# Patient Record
Sex: MALE | Race: WHITE | NOT HISPANIC OR LATINO | Employment: OTHER | URBAN - METROPOLITAN AREA
[De-identification: names, ages, dates, MRNs, and addresses within clinical notes are randomized per-mention and may not be internally consistent; named-entity substitution may affect disease eponyms.]

---

## 2017-01-01 ENCOUNTER — HOSPITAL ENCOUNTER (EMERGENCY)
Facility: HOSPITAL | Age: 77
Discharge: HOME | End: 2017-02-24
Attending: EMERGENCY MEDICINE | Admitting: EMERGENCY MEDICINE
Payer: COMMERCIAL

## 2017-01-01 ENCOUNTER — HOSPITAL ENCOUNTER (EMERGENCY)
Facility: HOSPITAL | Age: 77
Discharge: HOME | End: 2017-03-18
Attending: EMERGENCY MEDICINE | Admitting: EMERGENCY MEDICINE
Payer: COMMERCIAL

## 2017-01-01 ENCOUNTER — APPOINTMENT (EMERGENCY)
Dept: RADIOLOGY | Facility: HOSPITAL | Age: 77
End: 2017-01-01
Payer: COMMERCIAL

## 2017-01-01 ENCOUNTER — GENERIC CONVERSION - ENCOUNTER (OUTPATIENT)
Dept: OTHER | Facility: OTHER | Age: 77
End: 2017-01-01

## 2017-01-01 ENCOUNTER — APPOINTMENT (EMERGENCY)
Dept: CT IMAGING | Facility: HOSPITAL | Age: 77
End: 2017-01-01
Payer: COMMERCIAL

## 2017-01-01 VITALS
WEIGHT: 140 LBS | TEMPERATURE: 97.5 F | HEART RATE: 71 BPM | DIASTOLIC BLOOD PRESSURE: 61 MMHG | SYSTOLIC BLOOD PRESSURE: 120 MMHG | BODY MASS INDEX: 23.3 KG/M2 | RESPIRATION RATE: 18 BRPM | OXYGEN SATURATION: 96 %

## 2017-01-01 VITALS
WEIGHT: 134 LBS | DIASTOLIC BLOOD PRESSURE: 68 MMHG | SYSTOLIC BLOOD PRESSURE: 115 MMHG | TEMPERATURE: 97.8 F | OXYGEN SATURATION: 98 % | RESPIRATION RATE: 16 BRPM | BODY MASS INDEX: 22.3 KG/M2 | HEART RATE: 67 BPM

## 2017-01-01 DIAGNOSIS — R56.9 SEIZURE (HCC): Primary | ICD-10-CM

## 2017-01-01 DIAGNOSIS — M70.21 OLECRANON BURSITIS OF RIGHT ELBOW: Primary | ICD-10-CM

## 2017-01-01 DIAGNOSIS — R53.1 WEAKNESS: ICD-10-CM

## 2017-01-01 LAB
ALBUMIN SERPL BCP-MCNC: 3 G/DL (ref 3.5–5)
ALP SERPL-CCNC: 97 U/L (ref 46–116)
ALT SERPL W P-5'-P-CCNC: 18 U/L (ref 12–78)
ANION GAP BLD CALC-SCNC: 20 MMOL/L (ref 4–13)
ANION GAP SERPL CALCULATED.3IONS-SCNC: 10 MMOL/L (ref 4–13)
APTT PPP: 28 SECONDS (ref 24–36)
AST SERPL W P-5'-P-CCNC: 13 U/L (ref 5–45)
ATRIAL RATE: 80 BPM
BASOPHILS # BLD AUTO: 0.02 THOUSANDS/ΜL (ref 0–0.1)
BASOPHILS NFR BLD AUTO: 0 % (ref 0–1)
BILIRUB DIRECT SERPL-MCNC: 0.12 MG/DL (ref 0–0.2)
BILIRUB SERPL-MCNC: 0.2 MG/DL (ref 0.2–1)
BUN BLD-MCNC: 22 MG/DL (ref 5–25)
BUN SERPL-MCNC: 21 MG/DL (ref 5–25)
CA-I BLD-SCNC: 1.14 MMOL/L (ref 1.12–1.32)
CALCIUM SERPL-MCNC: 8.4 MG/DL (ref 8.3–10.1)
CHLORIDE BLD-SCNC: 102 MMOL/L (ref 100–108)
CHLORIDE SERPL-SCNC: 104 MMOL/L (ref 100–108)
CO2 SERPL-SCNC: 27 MMOL/L (ref 21–32)
CREAT BLD-MCNC: 1.6 MG/DL (ref 0.6–1.3)
CREAT SERPL-MCNC: 1.86 MG/DL (ref 0.6–1.3)
EOSINOPHIL # BLD AUTO: 0.05 THOUSAND/ΜL (ref 0–0.61)
EOSINOPHIL NFR BLD AUTO: 1 % (ref 0–6)
ERYTHROCYTE [DISTWIDTH] IN BLOOD BY AUTOMATED COUNT: 18.2 % (ref 11.6–15.1)
GFR SERPL CREATININE-BSD FRML MDRD: 35.3 ML/MIN/1.73SQ M
GFR SERPL CREATININE-BSD FRML MDRD: 42 ML/MIN/1.73SQ M
GLUCOSE SERPL-MCNC: 104 MG/DL (ref 65–140)
GLUCOSE SERPL-MCNC: 99 MG/DL (ref 65–140)
HCT VFR BLD AUTO: 36.7 % (ref 36.5–49.3)
HCT VFR BLD CALC: 40 % (ref 36.5–49.3)
HGB BLD-MCNC: 11.7 G/DL (ref 12–17)
HGB BLDA-MCNC: 13.6 G/DL (ref 12–17)
INR PPP: 1.22 (ref 0.86–1.16)
LYMPHOCYTES # BLD AUTO: 1.12 THOUSANDS/ΜL (ref 0.6–4.47)
LYMPHOCYTES NFR BLD AUTO: 17 % (ref 14–44)
MCH RBC QN AUTO: 29.6 PG (ref 26.8–34.3)
MCHC RBC AUTO-ENTMCNC: 31.9 G/DL (ref 31.4–37.4)
MCV RBC AUTO: 93 FL (ref 82–98)
MONOCYTES # BLD AUTO: 0.34 THOUSAND/ΜL (ref 0.17–1.22)
MONOCYTES NFR BLD AUTO: 5 % (ref 4–12)
NEUTROPHILS # BLD AUTO: 5.25 THOUSANDS/ΜL (ref 1.85–7.62)
NEUTS SEG NFR BLD AUTO: 77 % (ref 43–75)
P AXIS: 69 DEGREES
PCO2 BLD: 24 MMOL/L (ref 21–32)
PLATELET # BLD AUTO: 147 THOUSANDS/UL (ref 149–390)
PMV BLD AUTO: 11.3 FL (ref 8.9–12.7)
POTASSIUM BLD-SCNC: 4.6 MMOL/L (ref 3.5–5.3)
POTASSIUM SERPL-SCNC: 4.5 MMOL/L (ref 3.5–5.3)
PR INTERVAL: 152 MS
PROT SERPL-MCNC: 6.7 G/DL (ref 6.4–8.2)
PROTHROMBIN TIME: 15.1 SECONDS (ref 12–14.3)
QRS AXIS: 49 DEGREES
QRSD INTERVAL: 102 MS
QT INTERVAL: 374 MS
QTC INTERVAL: 431 MS
RBC # BLD AUTO: 3.95 MILLION/UL (ref 3.88–5.62)
SODIUM BLD-SCNC: 141 MMOL/L (ref 136–145)
SODIUM SERPL-SCNC: 141 MMOL/L (ref 136–145)
SPECIMEN SOURCE: ABNORMAL
T WAVE AXIS: 77 DEGREES
VENTRICULAR RATE: 80 BPM
WBC # BLD AUTO: 6.78 THOUSAND/UL (ref 4.31–10.16)

## 2017-01-01 PROCEDURE — 85025 COMPLETE CBC W/AUTO DIFF WBC: CPT | Performed by: EMERGENCY MEDICINE

## 2017-01-01 PROCEDURE — 85610 PROTHROMBIN TIME: CPT | Performed by: EMERGENCY MEDICINE

## 2017-01-01 PROCEDURE — 80048 BASIC METABOLIC PNL TOTAL CA: CPT | Performed by: EMERGENCY MEDICINE

## 2017-01-01 PROCEDURE — 71020 HB CHEST X-RAY 2VW FRONTAL&LATL: CPT

## 2017-01-01 PROCEDURE — 99283 EMERGENCY DEPT VISIT LOW MDM: CPT

## 2017-01-01 PROCEDURE — 80047 BASIC METABLC PNL IONIZED CA: CPT

## 2017-01-01 PROCEDURE — 93005 ELECTROCARDIOGRAM TRACING: CPT | Performed by: EMERGENCY MEDICINE

## 2017-01-01 PROCEDURE — 36415 COLL VENOUS BLD VENIPUNCTURE: CPT | Performed by: EMERGENCY MEDICINE

## 2017-01-01 PROCEDURE — 96361 HYDRATE IV INFUSION ADD-ON: CPT

## 2017-01-01 PROCEDURE — 70450 CT HEAD/BRAIN W/O DYE: CPT

## 2017-01-01 PROCEDURE — 85014 HEMATOCRIT: CPT

## 2017-01-01 PROCEDURE — 99285 EMERGENCY DEPT VISIT HI MDM: CPT

## 2017-01-01 PROCEDURE — 85730 THROMBOPLASTIN TIME PARTIAL: CPT | Performed by: EMERGENCY MEDICINE

## 2017-01-01 PROCEDURE — 96374 THER/PROPH/DIAG INJ IV PUSH: CPT

## 2017-01-01 PROCEDURE — 80076 HEPATIC FUNCTION PANEL: CPT | Performed by: EMERGENCY MEDICINE

## 2017-01-01 RX ORDER — LIDOCAINE HYDROCHLORIDE AND EPINEPHRINE 10; 10 MG/ML; UG/ML
1 INJECTION, SOLUTION INFILTRATION; PERINEURAL ONCE
Status: COMPLETED | OUTPATIENT
Start: 2017-01-01 | End: 2017-01-01

## 2017-01-01 RX ORDER — ONDANSETRON 2 MG/ML
INJECTION INTRAMUSCULAR; INTRAVENOUS
Status: COMPLETED
Start: 2017-01-01 | End: 2017-01-01

## 2017-01-01 RX ORDER — ONDANSETRON 2 MG/ML
4 INJECTION INTRAMUSCULAR; INTRAVENOUS ONCE
Status: COMPLETED | OUTPATIENT
Start: 2017-01-01 | End: 2017-01-01

## 2017-01-01 RX ORDER — ONDANSETRON 2 MG/ML
INJECTION INTRAMUSCULAR; INTRAVENOUS
Status: DISCONTINUED
Start: 2017-01-01 | End: 2017-01-01 | Stop reason: HOSPADM

## 2017-01-01 RX ADMIN — ONDANSETRON 4 MG: 2 INJECTION INTRAMUSCULAR; INTRAVENOUS at 11:58

## 2017-01-01 RX ADMIN — SODIUM CHLORIDE 1000 ML: 0.9 INJECTION, SOLUTION INTRAVENOUS at 11:57

## 2017-01-01 RX ADMIN — LIDOCAINE HYDROCHLORIDE,EPINEPHRINE BITARTRATE 1 ML: 10; .01 INJECTION, SOLUTION INFILTRATION; PERINEURAL at 14:35

## 2018-01-10 NOTE — PROGRESS NOTES
Chief Complaint  Marla Elizabeth (nurse) from [facility] called stating the patient had an fall in December and received an open traumatic brain injury  Patient was discharged from rehab 2/10 and is receiving home care for PT/OT, speech therapy and skilled nursing, He's currently not eating or drinking well and a dietician will be in to evaluate him  Did you want to monitor his blood work CBC,BMP etc       Active Problems    1  Acute bronchitis (466 0) (J20 9)   2  Aftercare following surgery of the musculoskeletal system (V58 78) (Z47 89)   3  Anemia (285 9) (D64 9)   4  ASCVD (arteriosclerotic cardiovascular disease) (429 2,440 9) (I25 10)   5  Benign essential hypertension (401 1) (I10)   6  Benign prostatic hypertrophy (600 00) (N40 0)   7  Bulging lumbar disc (722 10) (M51 26)   8  History of CABG   9  Carotid artery stenosis (433 10) (I65 29)   10  Cervical spondylosis (721 0) (M47 812)   11  Cervical stenosis of spine (723 0) (M48 02)   12  Chronic kidney disease, stage 3 (585 3) (N18 3)   13  Chronic kidney disease, stage IV (severe) (585 4) (N18 4)   14  Chronic lumbar radiculopathy (724 4) (M54 16)   15  Coronary artery disease (414 00) (I25 10)   16  Degenerative disc disease, cervical (722 4) (M50 30)   17  Diastolic dysfunction (119 8) (I51 9)   18  Disc degeneration, lumbar (722 52) (M51 36)   19  Dyspnea (786 09) (R06 00)   20  Esophageal reflux (530 81) (K21 9)   21  Gout (274 9) (M10 9)   22  Hypercalcemia (275 42) (E83 52)   23  Hyperlipidemia (272 4) (E78 5)   24  Iron deficiency anemia (280 9) (D50 9)   25  Joint pain, knee (719 46) (M25 569)   26  Long term use of drug (V58 69) (Z79 899)   27  Lumbago (724 2) (M54 5)   28  Lumbar canal stenosis (724 02) (M48 06)   29  Lumbar canal stenosis (724 02) (M48 06)   30  Lumbar canal stenosis (724 02) (M48 06)   31  History of Lumbar canal stenosis (724 02) (M48 06)   32  Lumbar disc herniation (722 10) (M51 26)   33   History of Lumbar radiculopathy (724 4) (M54 16)   34  Lumbar radiculopathy (724 4) (M54 16)   35  Lumbar spinal stenosis (724 02) (M48 06)   36  Lumbar stenosis with neurogenic claudication (724 03) (M48 06)   37  Mitral regurgitation (424 0) (I34 0)   38  Myalgia And Myositis (729 1)   39  Myelomalacia (336 8) (G95 89)   40  Need for prophylactic vaccination against single diseases (V05 9) (Z23)   41  Need for prophylactic vaccination and inoculation against influenza (V04 81) (Z23)   42  Neuropathy (355 9) (G62 9)   43  Non-healing surgical wound, initial encounter (998 83) (T81 89XA)   44  Numbness (782 0) (R20 0)   45  Osteoarthritis of knee (715 36) (M17 9)   46  Peripheral arterial disease (443 9) (I73 9)   47  Premature ventricular contractions (PVCs) (VPCs) (427 69) (I49 3)   48  Pre-operative cardiovascular examination (V72 81) (Z01 810)   49  Scoliosis (737 30) (M41 9)   50  Screening for genitourinary condition (V81 6) (Z13 89)   51  Screening for neurological condition (V80 09) (Z13 89)   52  Shortness of breath (786 05) (R06 02)   53  Sinus tachycardia (427 89) (R00 0)   54  Spinal cord compression (336 9) (G95 20)   55  Spinal deformity (756 10) (Q76 49)   56  Spondylosis of lumbar region without myelopathy or radiculopathy (721 3) (M47 816)   57  Status post umbilical hernia repair, follow-up exam (V67 09) (Y66)   58  Syncope, unspecified syncope type (780 2) (R55)   59  Thoracic disc disorder with myelopathy (722 72) (M51 04)   60  Thoracic myelopathy (721 41) (M47 14)   61  Trigger index finger of left hand (727 03) (M65 322)   62  Umbilical hernia without obstruction and without gangrene (553 1) (K42 9)   63  Urinary frequency (788 41) (R35 0)   64  Visit for pre-operative examination (V72 84) (Z01 818)    Current Meds   1  Allopurinol 300 MG Oral Tablet; TAKE 1 TABLET DAILY; Therapy: (Recorded:26Jan2015) to Recorded   2  Amitriptyline HCl - 10 MG Oral Tablet; TAKE 1 TABLET AT BEDTIME;    Therapy: 50VWR0551 to (Rhiannon Umaña)  Requested for: 32QXL4565; Last   Rx:79Mcm2882 Ordered   3  Aspirin EC 81 MG Oral Tablet Delayed Release; TAKE 1 TABLET DAILY; Therapy: 78CIZ0976 to [id number])  Requested for: 30VJL1395; Last   Rx:05Xnn1345 Ordered   4  Atorvastatin Calcium 20 MG Oral Tablet; TAKE 1 TABLET DAILY; Therapy: 72PBI4049 to (Evaluate:23Gex4796)  Requested for: 29MHA1913; Last   Rx:21Ggr9942 Ordered   5  Azithromycin 250 MG Oral Tablet; TAKE 2 TABLETS ON DAY 1 THEN TAKE 1 TABLET A   DAY FOR 4 DAYS; Therapy: 81ZVQ8364 to (Last Rx:06Uxd3886)  Requested for: 02Fci1418 Ordered   6  Gabapentin 300 MG Oral Capsule; 2 tabs po bid; Therapy: 34Utn5846 to (Evaluate:11Qnb2492)  Requested for: 13CVQ0028; Last   ZX:58SJW5232 Ordered   7  Pantoprazole Sodium 40 MG Oral Tablet Delayed Release; take 1 tablet once daily; Therapy: 75HSA3375 to (Evaluate:63Rbk2774)  Requested for: 91Fsl4515; Status:   ACTIVE - Renewal Voided Recorded   8  Proventil  (90 Base) MCG/ACT Inhalation Aerosol Solution; INHALE 2 PUFFS   EVERY 4 HOURS AS NEEDED FOR COUGH AND WHEEZE;   Therapy: 14Oct2016 to (Last Rx:54Ljc2713)  Requested for: 14Oct2016 Ordered   9  Tamsulosin HCl - 0 4 MG Oral Capsule; Take 1 capsule one-half hour before same meal   daily; Therapy: 02GHR8134 to (Evaluate:53Zls9394)  Requested for: 20Shl3896; Last   Rx:08Ilx1420 Ordered    Allergies    1  Oxycodone-Acetaminophen CAPS    2  No Known Environmental Allergies   3  No Known Food Allergies    Plan  Benign essential hypertension    · (1) CBC/PLT/DIFF; Status:Active; Requested for:86Vfv2771;    · (1) COMPREHENSIVE METABOLIC PANEL; Status:Active; Requested for:84Hvo7018;      Future Appointments    Date/Time Provider Specialty Site   03/13/2017 03:30 PM BELLA Taylor DO, [facility] Hematology On[facility]   [facility]14[facility]2017 12:30 [facility]   07/25/2017 01:30 PM Jose Hidalgo, M D  Neurology [address] [facility]     Signatures   Electronically signed by : BELLA Torres ; Feb 13 2017 10:40AM EST                       (Author)

## 2018-01-11 NOTE — MISCELLANEOUS
Message   Recorded as Task   Date: 03/21/2017 03:24 PM, Created By: [address] [place]   Task Name: Medical Complaint Callback   Assigned To: James Gale   Regarding Patient: Juan Rosado, Status: Active   Comment:    [address] [place] - 21 Mar 2017 3:24 PM     TASK CREATED  Caller: Kristan Beckham, Nurse Navigator; Medical Complaint; [phone]  Nurse from [facility] would like to speak with you in regards to the above patient  She has questions about some of his medications  c/b [phone]   I called back about getting off non-essential meds like statin and donepezil        Signatures   Electronically signed by : BELLA Dixon ; Mar 21 2017  5:21PM EST                       (Author)

## 2018-01-11 NOTE — RESULT NOTES
Message   Please inform patient - colon polyp biopsy was benign  He can call our office to follow up as needed  Verified Results  (1) TISSUE EXAM [id number] 04:28PM [address]      Test Name Result Flag Reference   LAB AP CASE REPORT (Report)     Surgical Pathology Report             Case: [id number]                   Authorizing Provider: Mervat Vidales PA-C   Collected:      05/27/2016 1628        Ordering Location:   [facility]    Received:      05/27/2016 [place] [place] [address] Endoscopy                               Pathologist:      Isa Guerrero MD                              Specimen:  Polyp, Colorectal, Cold biopsy hepatic flexure polyp   LAB AP FINAL DIAGNOSIS      A  Colon, hepatic flexure:    - Colonic mucosa without significant pathologic abnormalities   consistent with redundant mucosal fold  - No dysplasia or neoplasia identified  LAB AP SURGICAL ADDITIONAL INFORMATION (Report)     These tests were developed and their performance characteristics   determined by [facility] or [facility]  They may not be cleared or approved by the [place]  Food and   Drug Administration  The FDA has determined that such clearance or   approval is not necessary  These tests are used for clinical purposes  They should not be regarded as investigational or for research  This   laboratory has been approved by CLIA 88, designated as a high-complexity   laboratory and is qualified to perform these tests  - Interpretation performed at [facility], [address] [address]   LAB AP GROSS DESCRIPTION (Report)     A  The specimen is received in formalin, labeled with the patient's name   and hospital number, and is designated hepatic flexure polyp  The   specimen consists of one tan-pink soft tissue fragment measuring 0 3 cm in   greatest dimension  Entirely submitted  One cassette      Note: The estimated total formalin fixation time based upon information   provided by the submitting clinician and the standard processing schedule   is over 72 hours    MAS

## 2018-01-11 NOTE — RESULT NOTES
Message  Capsule endoscopy report - Small small bowel AVMs and erosion  No active bleeding  Recs- take one iron tablet daily  Monitor Hb closely  I called him and left VM        Signatures   Electronically signed by : Josefina Hester MD; Jan 4 2017  4:23PM EST                       (Author)

## 2018-01-12 NOTE — PROGRESS NOTES
Assessment    1  Iron deficiency anemia (280 9) (D50 9)    Plan  Iron deficiency anemia    · Drink plenty of fluids ; Status:Complete;   Done: 64VKH3280   Ordered; For:Iron deficiency anemia; Ordered By:Britton Maza;   · Follow-up visit in 6 months Evaluation and Treatment  Follow-up  Status: Hold For -  Scheduling  Requested for: 53Tqj1375   Ordered; For: Iron deficiency anemia; Ordered By: Tyler Miles Performed:  Due: 22YEA6883   · (1) CBC/PLT/DIFF; Status:Active; Requested for:69Inq5245;    Perform:[facility]; Due:05Ghz6113;Ordered; For:Iron deficiency anemia; Ordered By:Britton Maza;   · (1) CBC/PLT/DIFF; Status: In Progress - Order Generated; Requested for:Recurring  Schedule: 9/12/2016; 10/10/2016; 11/7/2016; 12/5/2016; 1/2/2017; 1/30/2017; 2/27/2017;  3/2   ; Perform:[facility]; [place]; For:Iron deficiency anemia; Ordered By:Britton Mzaa;    Discussion/Summary  Discussion Summary:   In summary, this is a 66year old man with a history of anemia as outlined  He had to bleeding episodes over the past 3 or 4 months  He had an EGD and colonoscopy, which showed no evident bleeding source  Capsule endoscopy is planned but has not yet been accomplished  We will be contacting GI to expedite this process  We reviewed the possibility of small bowel AVM, treatment for the same  Fortunately, he has no melena at the present time  I asked him to continue off of oral iron, so that if melena occurred  He would be able to recognize that a phone call would be required in response  Additionally, I asked him to have monthly CBC to allow for intervention is appropriate  If anemia would occur in the interim  The patient voiced understanding above discussion  I asked him to call if any questions arise  Understands and agrees with treatment plan: The treatment plan was reviewed with the patient/guardian   The patient/guardian understands and agrees with the treatment plan Counseling Documentation With Imm: The patient was counseled regarding diagnostic results, instructions for management, patient and family education, impressions  total time of encounter was 25 minutes  Chief Complaint  Chief Complaint Free Text Note Form: Follow-up regarding lymphoma  History of Present Illness  HPI: Has had back problems for years but worse lately  Oct 2014- Going to have spine surg and found to have hgb 6 5  Blood work showed a depressed ferritin, and saturation  Other studies were normal  The patient was started on Feraheme 510 mg x2 doses  Hemoglobin increased to 8 1 in 10 days  2/3/15- had back surgery  Complicated by wound infection  May 2016-patient was admitted to the hospital with progressive shortness of breath  Outpatient CBC showed a hemoglobin of 5 4  He was treated with transfusions  Iron saturation was 4%  He did not receive parenteral iron during his hospitalization  EGD showed hiatal hernia  Colonoscopy showed diverticulosis and a diminutive polyp which was removed  Interval History: Recently admitted for coffee ground emesis  Review of Systems  Complete-Male:   Constitutional: appetite poor but wt stable  Eyes: No complaints of eye pain, no red eyes, no discharge from eyes, no itchy eyes  ENT: no complaints of earache, no hearing loss, no nosebleeds, no nasal discharge, no sore throat, no hoarseness  Cardiovascular: No complaints of slow heart rate, no fast heart rate, no chest pain, no palpitations, no leg claudication, no lower extremity  Respiratory: no shortness of breath during exertion  Gastrointestinal: colonoscopy~2008-1 polyps  The patient presents with complaints of no nausea  The patient presents with complaints of gradual onset of constant episodes of moderate constipation  Episodes started about 2 years ago  Symptoms are improving     Genitourinary: No complaints of dysuria, no incontinence, no hesitancy, no nocturia, no genital lesion, no testicular pain  Musculoskeletal: No complaints of arthralgia, no myalgias, no joint swelling or stiffness, no limb pain or swelling  Integumentary: No complaints of skin rash or skin lesions, no itching, no skin wound, no dry skin  Neurological: tingling and BL legs to thigh  , but No compliants of headache, no confusion, no convulsions, no numbness or tingling, no dizziness or fainting, no limb weakness, no difficulty walking  Psychiatric: Is not suicidal, no sleep disturbances, no anxiety or depression, no change in personality, no emotional problems  Endocrine: No complaints of proptosis, no hot flashes, no muscle weakness, no erectile dysfunction, no deepening of the voice, no feelings of weakness  Hematologic/Lymphatic: No complaints of swollen glands, no swollen glands in the neck, does not bleed easily, no easy bruising  Active Problems    1  Acute bronchitis (466 0) (J20 9)   2  Aftercare following surgery of the musculoskeletal system (V58 78) (Z47 89)   3  Anemia (285 9) (D64 9)   4  ASCVD (arteriosclerotic cardiovascular disease) (429 2,440 9) (I25 10)   5  Benign essential hypertension (401 1) (I10)   6  Benign prostatic hypertrophy (600 00) (N40 0)   7  Bulging lumbar disc (722 10) (M51 26)   8  History of CABG   9  Carotid artery stenosis (433 10) (I65 29)   10  Cervical spondylosis (721 0) (M47 812)   11  Cervical stenosis of spine (723 0) (M48 02)   12  Chronic kidney disease, stage 3 (585 3) (N18 3)   13  Chronic kidney disease, stage IV (severe) (585 4) (N18 4)   14  Chronic lumbar radiculopathy (724 4) (M54 16)   15  Coronary artery disease (414 00) (I25 10)   16  Degenerative disc disease, cervical (722 4) (M50 30)   17  Diastolic dysfunction (748 6) (I51 9)   18  Disc degeneration, lumbar (722 52) (M51 36)   19  Dyspnea (786 09) (R06 00)   20  Esophageal reflux (530 81) (K21 9)   21  Gout (274 9) (M10 9)   22  Hypercalcemia (275 42) (E83 52)   23   Hyperlipidemia (272  4) (E78 5)   24  Iron deficiency anemia (280 9) (D50 9)   25  Joint pain, knee (719 46) (M25 569)   26  Long term use of drug (V58 69) (Z79 899)   27  Lumbago (724 2) (M54 5)   28  Lumbar canal stenosis (724 02) (M48 06)   29  Lumbar canal stenosis (724 02) (M48 06)   30  Lumbar canal stenosis (724 02) (M48 06)   31  History of Lumbar canal stenosis (724 02) (M48 06)   32  Lumbar disc herniation (722 10) (M51 26)   33  History of Lumbar radiculopathy (724 4) (M54 16)   34  Lumbar radiculopathy (724 4) (M54 16)   35  Lumbar spinal stenosis (724 02) (M48 06)   36  Lumbar stenosis with neurogenic claudication (724 03) (M48 06)   37  Mitral regurgitation (424 0) (I34 0)   38  Myalgia And Myositis (729 1)   39  Need for prophylactic vaccination against single diseases (V05 9) (Z23)   40  Need for prophylactic vaccination and inoculation against influenza (V04 81) (Z23)   41  Neuropathy (355 9) (G62 9)   42  Non-healing surgical wound, initial encounter (998 83) (T81 89XA)   43  Numbness (782 0) (R20 0)   44  Osteoarthritis of knee (715 36) (M17 9)   45  Peripheral arterial disease (443 9) (I73 9)   46  Premature ventricular contractions (PVCs) (VPCs) (427 69) (I49 3)   47  Pre-operative cardiovascular examination (V72 81) (Z01 810)   48  Scoliosis (737 30) (M41 9)   49  Screening for neurological condition (V80 09) (Z13 89)   50  Shortness of breath (786 05) (R06 02)   51  Sinus tachycardia (427 89) (R00 0)   52  Spinal cord compression (336 9) (G95 20)   53  Spinal deformity (756 10) (Q76 49)   54  Spondylosis of lumbar region without myelopathy or radiculopathy (721 3) (M47 816)   55  Status post umbilical hernia repair, follow-up exam (V67 09) (Y15)   56  Syncope, unspecified syncope type (780 2) (R55)   57  Thoracic disc disorder with myelopathy (722 72) (M51 04)   58  Thoracic myelopathy (721 41) (M47 14)   59  Trigger index finger of left hand (727 03) (M65 322)   60   Umbilical hernia without obstruction and without gangrene (553 1) (K42 9)   61  Urinary frequency (788 41) (R35 0)   62  Visit for pre-operative examination (V72 84) (A53 289)    Past Medical History    1  History of anemia (V12 3) (Z86 2)   2  History of gastritis (V12 79) (Z87 19)   3  History of gastroesophageal reflux (GERD) (V12 79) (Z87 19)   4  History of hiatal hernia (V12 79) (Z87 19)   5  History of hypertension (V12 59) (Z86 79)   6  History of low back pain (V13 59) (Z87 39)   7  History of nausea (V12 79) (Z87 898)   8  History of Lumbar canal stenosis (724 02) (M48 06)   9  History of Lumbar canal stenosis (724 02) (M48 06)   10  History of Lumbar radiculopathy (724 4) (M54 16)   11  History of Lumbar radiculopathy (724 4) (M54 16)   12  History of Lumbar radiculopathy (724 4) (M54 16)   13  History of Myelopathy (336 9) (G95 9)   14  Need for prophylactic vaccination and inoculation against influenza (V04 81) (Z23)   15  History of Nephrolithiasis (592 0) (N20 0)   16  History of Non-healing surgical wound, subsequent encounter (V58 89,998 83)    (T81 89XD)   17  History of Prostate cancer (185) (C61)   18  History of Prostate Cancer (V10 46)   19  History of Tachycardia (785 0) (R00 0)   20  History of Thoracic disc disorder with myelopathy (722 72) (M51 04)   21  History of Vasovagal syncope (780 2) (R55)    Surgical History    1  History of Back Surgery   2  History of CABG   3  History of Complete Colonoscopy   4  History of Diagnostic Esophagogastroduodenoscopy   5  History of Hand Incision Tendon Sheath Of A Finger   6  History of Hernia Repair Inguinal Unilateral   7  History of Knee Replacement   8  History of Laminectomy Lumbar   9  History of Neuroplasty Decompression Median Nerve At Carpal Tunnel   10  History of Spinal Diskectomy Lumbar   11  History of Umbilical Hernia Repair - Over Age 6   17  History of Wrist Surgery Left    Family History  Mother    1  Denied: Family history of Colon cancer   2   Denied: Family history of Crohn's disease without complication, unspecified   gastrointestinal tract location   3  Family history of    4  Denied: Family history of liver disease   5  Family history of Heart disease  Father    10  Denied: Family history of Colon cancer   7  Denied: Family history of Crohn's disease without complication, unspecified   gastrointestinal tract location   8  Family history of    5  Family history of cardiac disorder (V1 49) (Z82 49)   10  Denied: Family history of liver disease  Other    11  Family history of Medical history unknown  Unknown    12  Family history of hypertension (V1 49) (Z82 49)   13  Family history of Prostate cancer    Social History    · Denied: History of Currently sexually active   · Denied: Drug Use   · Former smoker ( 82) (Z85 390)   · Minimum alcohol consumption   · No known STD risk factors   · Retired    Current Meds   1  Allopurinol 300 MG Oral Tablet; TAKE 1 TABLET DAILY; Therapy: (Recorded:2015) to Recorded   2  Aspirin EC 81 MG Oral Tablet Delayed Release; TAKE 1 TABLET DAILY; Therapy: 03SMX2561 to [id number])  Requested for: 39JGL0316; Last   Rx:89Luk0462 Ordered   3  Atorvastatin Calcium 20 MG Oral Tablet; TAKE 1 TABLET DAILY; Therapy: 28GZL8042 to (Evaluate:74Sct2314)  Requested for: 32NBO1119; Last   Rx:28Rtj9658 Ordered   4  Gabapentin 300 MG Oral Capsule; 2 tabs po bid; Therapy: 2016 to (Evaluate:27Saf9784)  Requested for: 06ZMQ2913; Last   SY:97PYE9179 Ordered   5  Pantoprazole Sodium 40 MG Oral Tablet Delayed Release; take 1 tablet once daily; Therapy: 68FQX9102 to (Evaluate:84Ndm8823)  Requested for: 95Rpn8200; Status: ACTIVE   - Renewal Voided Recorded   6  Tamsulosin HCl - 0 4 MG Oral Capsule; Take 1 capsule one-half hour before same meal   daily; Therapy: 10PXR2421 to (Evaluate:08Hfu7869)  Requested for: 44Iru4244; Last   Rx:75Ytp2618 Ordered    Allergies    1  Oxycodone-Acetaminophen CAPS    2   No Known Environmental Allergies   3  No Known Food Allergies    Vitals  Vital Signs    Recorded: 17KWY2764 33:60LB   Systolic 374   Diastolic 78   Heart Rate 87   Respiration 20   Temperature 98 3 F   Pain Scale 0   O2 Saturation 97   Height 5 ft 4 in   Weight 148 lb 4 00 oz   BMI Calculated 25 45   BSA Calculated 1 73     Physical Exam    Constitutional   General appearance: No acute distress, well appearing and well nourished  Eyes   Conjunctiva and lids: No swelling, erythema, or discharge  Ears, Nose, Mouth, and Throat   External inspection of ears and nose: Normal     Oropharynx: Normal with no erythema, edema, exudate or lesions  Pulmonary   Auscultation of lungs: Clear to auscultation, equal breath sounds bilaterally, no wheezes, no rales, no rhonci  Cardiovascular   Auscultation of heart: Normal rate and rhythm, normal S1 and S2, without murmurs  Examination of extremities for edema and/or varicosities: Normal     Abdomen   Abdomen: Non-tender, no masses  Liver and spleen: No hepatomegaly or splenomegaly  Lymphatic   Palpation of lymph nodes in neck: No lymphadenopathy  Musculoskeletal   Gait and station: Normal     Skin   Skin and subcutaneous tissue: Normal without rashes or lesions  Neurologic   Cranial nerves: Cranial nerves 2-12 intact  Psychiatric   Orientation to person, place and time: Normal          Health Management  Health Maintenance   Medicare Annual Wellness Visit; every 1 year; Next Due: 69LPZ4842;  Overdue    Future Appointments    Date/Time Provider Specialty Site   10/11/2016 01:15 PM Kayla Burris [facility] Neurology [address]     Signatures   Electronically signed by : BELLA Flynn D ,DO; Sep 12 2016  5:07PM EST                       (Author)

## 2018-01-14 NOTE — MISCELLANEOUS
Provider Comments  Provider Comments:   1st no show appointment with neurology  No call, no message received  Dariusz Ma, [address] contacted the patient to reschedule his missed appointment   Patient rescheduled to see our physician assistant on 11/2/16 at 10:30am       Signatures   Electronically signed by : Ml Cobos [facility]; Oct 12 2016  2:33PM EST                       (Author)

## 2018-01-15 NOTE — RESULT NOTES
Message    Gastric biopsies are benign and negative for H  pylori  Patient to continue Protonix      pt aware of results  CF         Verified Results  (1) TISSUE EXAM [id number] 04:27PM [facility]     Test Name Result Flag Reference   LAB AP CASE REPORT (Report)     Surgical Pathology Report             Case: [id number]                   Authorizing Provider: Emmanuel King MD     Collected:      08/29/2016 1627        Ordering Location:   [facility]    Received:      08/29/2016 [facility]                               Pathologist:      Elyssa Chisholm MD                              Specimen:  Stomach, Gastric body   LAB AP FINAL DIAGNOSIS (Report)     A  Stomach, body, biopsy:        - Oxyntic mucosa with mild chronic inactive gastritis  - No Helicobacter pylori organisms are identified on the   immunohistochemical stain, performed with an appropriate positive control         - No intestinal metaplasia, dysplasia or malignancy is   identified  Interpretation performed at [address] [facility]  Electronically signed by Elyssa Chisholm MD on 9/1/2016 at 12:01 PM   LAB AP SURGICAL ADDITIONAL INFORMATION (Report)     These tests were developed and their performance characteristics   determined by [facility] or [facility] [[email or web address]]  They may not be cleared or approved by the [place]  Food and   Drug Administration  The FDA has determined that such clearance or   approval is not necessary  These tests are used for clinical purposes  They should not be regarded as investigational or for research  This   laboratory has been approved by CLIA 88, designated as a high-complexity   laboratory and is qualified to perform these tests  LAB AP GROSS DESCRIPTION (Report)     A  The specimen is received in formalin, labeled with the patient's name   and hospital number, and is designated gastric body   The specimen consists of 2 tan-pink soft tissue fragments measuring 0 2 cm and 0 5 cm   in greatest dimension  Entirely submitted  One cassette  Note: The estimated total formalin fixation time based upon information   provided by the submitting clinician and the standard processing schedule   is 23 5 hours    MAS   LAB AP CLINICAL INFORMATION R/o h pylori     R/o h pylori

## 2018-01-15 NOTE — MISCELLANEOUS
History of Present Illness  TCM Communication [facility]: The patient is being contacted for follow-up after hospitalization  Hospital records were reviewed  He was hospitalized at [address] [place] [address]  The date of admission: 08/27/2016, date of discharge: 08/31/2016  Diagnosis: GI bleed  He was discharged to home  Medications were not reviewed today  He scheduled a follow up appointment  The patient is currently asymptomatic  Counseling was provided to the patient  Communication performed and completed by Cesar Herzog      Active Problems    1  Acute bronchitis (466 0) (J20 9)   2  Aftercare following surgery of the musculoskeletal system (V58 78) (Z47 89)   3  Anemia (285 9) (D64 9)   4  ASCVD (arteriosclerotic cardiovascular disease) (429 2,440 9) (I25 10)   5  Benign essential hypertension (401 1) (I10)   6  Benign prostatic hypertrophy (600 00) (N40 0)   7  Bulging lumbar disc (722 10) (M51 26)   8  History of CABG   9  Carotid artery stenosis (433 10) (I65 29)   10  Cervical spondylosis (721 0) (M47 812)   11  Cervical stenosis of spine (723 0) (M48 02)   12  Chronic kidney disease, stage 3 (585 3) (N18 3)   13  Chronic kidney disease, stage IV (severe) (585 4) (N18 4)   14  Chronic lumbar radiculopathy (724 4) (M54 16)   15  Coronary artery disease (414 00) (I25 10)   16  Degenerative disc disease, cervical (722 4) (M50 30)   17  Diastolic dysfunction (844 6) (I51 9)   18  Disc degeneration, lumbar (722 52) (M51 36)   19  Dyspnea (786 09) (R06 00)   20  Esophageal reflux (530 81) (K21 9)   21  Gout (274 9) (M10 9)   22  Hypercalcemia (275 42) (E83 52)   23  Hyperlipidemia (272 4) (E78 5)   24  Iron deficiency anemia (280 9) (D50 9)   25  Joint pain, knee (719 46) (M25 569)   26  Long term use of drug (V58 69) (Z79 899)   27  Lumbago (724 2) (M54 5)   28  Lumbar canal stenosis (724 02) (M48 06)   29  Lumbar canal stenosis (724 02) (M48 06)   30  Lumbar canal stenosis (724 02) (M48 06)   31   History of Lumbar canal stenosis (724 02) (M48 06)   32  Lumbar disc herniation (722 10) (M51 26)   33  History of Lumbar radiculopathy (724 4) (M54 16)   34  Lumbar radiculopathy (724 4) (M54 16)   35  Lumbar spinal stenosis (724 02) (M48 06)   36  Lumbar stenosis with neurogenic claudication (724 03) (M48 06)   37  Mitral regurgitation (424 0) (I34 0)   38  Myalgia And Myositis (729 1)   39  Need for prophylactic vaccination against single diseases (V05 9) (Z23)   40  Need for prophylactic vaccination and inoculation against influenza (V04 81) (Z23)   41  Neuropathy (355 9) (G62 9)   42  Non-healing surgical wound, initial encounter (998 83) (T81 89XA)   43  Numbness (782 0) (R20 0)   44  Osteoarthritis of knee (715 36) (M17 9)   45  Peripheral arterial disease (443 9) (I73 9)   46  Premature ventricular contractions (PVCs) (VPCs) (427 69) (I49 3)   47  Pre-operative cardiovascular examination (V72 81) (Z01 810)   48  Scoliosis (737 30) (M41 9)   49  Screening for neurological condition (V80 09) (Z13 89)   50  Shortness of breath (786 05) (R06 02)   51  Sinus tachycardia (427 89) (R00 0)   52  Spinal cord compression (336 9) (G95 20)   53  Spinal deformity (756 10) (Q76 49)   54  Spondylosis of lumbar region without myelopathy or radiculopathy (721 3) (M47 816)   55  Status post umbilical hernia repair, follow-up exam (V67 09) (A54)   56  Syncope, unspecified syncope type (780 2) (R55)   57  Thoracic disc disorder with myelopathy (722 72) (M51 04)   58  Thoracic myelopathy (721 41) (M47 14)   59  Trigger index finger of left hand (727 03) (M65 322)   60  Umbilical hernia without obstruction and without gangrene (553 1) (K42 9)   61  Urinary frequency (788 41) (R35 0)   62  Visit for pre-operative examination (V72 84) (N19 990)    Past Medical History    1  History of anemia (V12 3) (Z86 2)   2  History of gastritis (V12 79) (Z87 19)   3  History of gastroesophageal reflux (GERD) (V12 79) (Z87 19)   4   History of hiatal hernia (V12 79) (Z87 19)   5  History of hypertension (V12 59) (Z86 79)   6  History of low back pain (V13 59) (Z87 39)   7  History of nausea (V12 79) (Z87 898)   8  History of Lumbar canal stenosis (724 02) (M48 06)   9  History of Lumbar canal stenosis (724 02) (M48 06)   10  History of Lumbar radiculopathy (724 4) (M54 16)   11  History of Lumbar radiculopathy (724 4) (M54 16)   12  History of Lumbar radiculopathy (724 4) (M54 16)   13  History of Myelopathy (336 9) (G95 9)   14  Need for prophylactic vaccination and inoculation against influenza (V04 81) (Z23)   15  History of Nephrolithiasis (592 0) (N20 0)   16  History of Non-healing surgical wound, subsequent encounter (V58 89,998 83)    (T81 89XD)   17  History of Prostate cancer (185) (C61)   18  History of Prostate Cancer (V10 46)   19  History of Tachycardia (785 0) (R00 0)   20  History of Thoracic disc disorder with myelopathy (722 72) (M51 04)   21  History of Vasovagal syncope (780 2) (R55)    Surgical History    1  History of Back Surgery   2  History of CABG   3  History of Complete Colonoscopy   4  History of Diagnostic Esophagogastroduodenoscopy   5  History of Hand Incision Tendon Sheath Of A Finger   6  History of Hernia Repair Inguinal Unilateral   7  History of Knee Replacement   8  History of Laminectomy Lumbar   9  History of Neuroplasty Decompression Median Nerve At Carpal Tunnel   10  History of Spinal Diskectomy Lumbar   11  History of Umbilical Hernia Repair - Over Age 4   17  History of Wrist Surgery Left    Family History  Mother    1  Denied: Family history of Colon cancer   2  Denied: Family history of Crohn's disease without complication, unspecified   gastrointestinal tract location   3  Family history of    4  Denied: Family history of liver disease   5  Family history of Heart disease  Father    10  Denied: Family history of Colon cancer   7   Denied: Family history of Crohn's disease without complication, unspecified gastrointestinal tract location   8  Family history of    5  Family history of cardiac disorder (V1 49) (Z82 49)   10  Denied: Family history of liver disease  Other    11  Family history of Medical history unknown  Unknown    12  Family history of hypertension (V1 49) (Z82 49)   13  Family history of Prostate cancer    Social History    · Denied: History of Currently sexually active   · Denied: Drug Use   · Former smoker ( 82) (V51 424)   · Minimum alcohol consumption   · No known STD risk factors   · Retired    Current Meds   1  Allopurinol 300 MG Oral Tablet; TAKE 1 TABLET DAILY; Therapy: (Recorded:2015) to Recorded   2  Aspirin EC 81 MG Oral Tablet Delayed Release; TAKE 1 TABLET DAILY; Therapy: 17QYZ3969 to [id number])  Requested for: 00MKR7794; Last   Rx:17Dhk2076 Ordered   3  Atorvastatin Calcium 20 MG Oral Tablet; TAKE 1 TABLET DAILY; Therapy: 43CPT7069 to (Evaluate:75Nhp6486)  Requested for: 90XIB2584; Last   Rx:26Huz7885 Ordered   4  Gabapentin 300 MG Oral Capsule; 2 tabs po bid; Therapy: 01Poq6263 to (Evaluate:95Idm6114)  Requested for: 33JTP4344; Last   TZ:30ZJF6342 Ordered   5  Pantoprazole Sodium 40 MG Oral Tablet Delayed Release; take 1 tablet once daily; Therapy: 52OIW4867 to (Evaluate:04Ndz8977)  Requested for: 2016; Status: ACTIVE   - Renewal Voided Recorded   6  Tamsulosin HCl - 0 4 MG Oral Capsule; Take 1 capsule one-half hour before same meal   daily; Therapy: 72PEZ6820 to (Evaluate:50Gbf5175)  Requested for: 09Zar7777; Last   Rx:07Cin9599 Ordered    Allergies    1  Oxycodone-Acetaminophen CAPS    2  No Known Environmental Allergies   3  No Known Food Allergies    Provider Comments  #1  The patient was a no-show for his TCM visit  Health Management  Health Maintenance   Medicare Annual Wellness Visit; every 1 year; Next Due: 13BXU4312;  Overdue    Future Appointments    Date/Time Provider Specialty Site   2016 03:15 PM BELLA Call , DO [facility] Hematology Oncology [facility]   10/11/2016 01:15 PM Jamarcus Park, [facility] Neurology [place] [address]     Signatures   Electronically signed by : Samuel Morris, ; Aug 31 2016  2:40PM EST                       (Author)    Electronically signed by :  Apurva Hardwick MD; Sep  8 2016  2:37PM EST                       (Author)

## 2018-01-16 NOTE — MISCELLANEOUS
Chief Complaint  Chief Complaint Free Text Note Form: The patient declined a [facility] appointment because he is following up with his hematologist       Active Problems     1  Acute bronchitis (466 0) (J20 9)   2  Aftercare following surgery of the musculoskeletal system (V58 78) (Z47 89)   3  Benign prostatic hypertrophy (600 00) (N40 0)   4  Bulging lumbar disc (722 10) (M51 26)   5  Carotid artery stenosis (433 10) (I65 29)   6  Cervical spondylosis (721 0) (M47 812)   7  Cervical stenosis of spine (723 0) (M48 02)   8  Chronic kidney disease, stage IV (severe) (585 4) (N18 4)   9  Chronic lumbar radiculopathy (724 4) (M54 16)   10  Degenerative disc disease, cervical (722 4) (M50 30)   11  Diastolic dysfunction (510 7) (I51 9)   12  Disc degeneration, lumbar (722 52) (M51 36)   13  Dyspnea (786 09) (R06 00)   14  Esophageal reflux (530 81) (K21 9)   15  Gout (274 9) (M10 9)   16  Hypercalcemia (275 42) (E83 52)   17  Iron deficiency anemia (280 9) (D50 9)   18  Joint pain, knee (719 46) (M25 569)   19  Long term use of drug (V58 69) (Z79 899)   20  Lumbago (724 2) (M54 5)   21  Lumbar canal stenosis (724 02) (M48 06)   22  Lumbar canal stenosis (724 02) (M48 06)   23  Lumbar canal stenosis (724 02) (M48 06)   24  History of Lumbar canal stenosis (724 02) (M48 06)   25  Lumbar disc herniation (722 10) (M51 26)   26  Lumbar radiculopathy (724 4) (M54 16)   27  History of Lumbar radiculopathy (724 4) (M54 16)   28  Lumbar spinal stenosis (724 02) (M48 06)   29  Lumbar stenosis with neurogenic claudication (724 03) (M48 06)   30  Mitral regurgitation (424 0) (I34 0)   31  Myalgia And Myositis (729 1)   32  Need for prophylactic vaccination against single diseases (V05 9) (Z23)   33  Need for prophylactic vaccination and inoculation against influenza (V04 81) (Z23)   34  Non-healing surgical wound, initial encounter (998 83) (T81 89XA)   35  Numbness (782 0) (R20 0)   36  Osteoarthritis of knee (715 36) (M17 9)   37  Premature ventricular contractions (PVCs) (VPCs) (427 69) (I49 3)   38  Scoliosis (737 30) (M41 9)   39  Screening for neurological condition (V80 09) (Z13 89)   40  Shortness of breath (786 05) (R06 02)   41  Sinus tachycardia (427 89) (R00 0)   42  Spinal cord compression (336 9) (G95 20)   43  Spinal deformity (756 10) (Q76 49)   44  Spondylosis of lumbar region without myelopathy or radiculopathy (721 3) (M47 816)   45  Syncope, unspecified syncope type (780 2) (R55)   46  Thoracic disc disorder with myelopathy (722 72) (M51 04)   47  Trigger index finger of left hand (727 03) (M65 322)   48  Urinary frequency (788 41) (R35 0)   49  Visit for pre-operative examination (V72 84) (Z01 818)    Benign essential hypertension (401 1) (I10)       Coronary artery disease (414 00) (I25 10)       Hyperlipidemia (272 4) (E78 5)       Chronic kidney disease, stage 3 (585 3) (N18 3)       Pre-operative cardiovascular examination (V72 81) (Z01 810)       History of CABG       Peripheral arterial disease (443 9) (I73 9)       Anemia (285 9) (D64 9)       ASCVD (arteriosclerotic cardiovascular disease) (429 2) (I25 10)          Past Medical History    1  History of anemia (V12 3) (Z86 2)   2  History of gastritis (V12 79) (Z87 19)   3  History of gastroesophageal reflux (GERD) (V12 79) (Z87 19)   4  History of hiatal hernia (V12 79) (Z87 19)   5  History of hypertension (V12 59) (Z86 79)   6  History of low back pain (V13 59) (Z87 39)   7  History of nausea (V12 79) (Z87 898)   8  History of Lumbar canal stenosis (724 02) (M48 06)   9  History of Lumbar canal stenosis (724 02) (M48 06)   10  History of Lumbar radiculopathy (724 4) (M54 16)   11  History of Lumbar radiculopathy (724 4) (M54 16)   12  History of Lumbar radiculopathy (724 4) (M54 16)   13  History of Myelopathy (336 9) (G95 9)   14  Need for prophylactic vaccination and inoculation against influenza (V04 81) (Z23)   15   History of Nephrolithiasis (592 0) (N20 0) 16  History of Non-healing surgical wound, subsequent encounter (V58 89,998 83)    (T81 89XD)   17  History of Prostate cancer (185) (C61)   18  History of Prostate Cancer (V10 46)   19  History of Tachycardia (785 0) (R00 0)   20  History of Thoracic disc disorder with myelopathy (722 72) (M51 04)   21  History of Vasovagal syncope (780 2) (R55)    Surgical History     1  History of Back Surgery   2  History of Hand Incision Tendon Sheath Of A Finger   3  History of Hernia Repair Inguinal Unilateral   4  History of Knee Replacement   5  History of Laminectomy Lumbar   6  History of Neuroplasty Decompression Median Nerve At Carpal Tunnel   7  History of Spinal Diskectomy Lumbar   8  History of Wrist Surgery Left    History of CABG     - CABG x 4 in           Family History  Mother    1  Family history of    2  Family history of Heart disease  Father    3  Family history of    4  Family history of cardiac disorder (V17 49) (Z82 49)  Other    5  Family history of Medical history unknown  Unknown    6  Family history of hypertension (V17 49) (Z82 49)   7  Family history of Prostate cancer    Social History    · Denied: History of Currently sexually active   · Denied: Drug Use   · Former smoker (V15 82) (Z42 850)   · Minimum alcohol consumption   · No known STD risk factors   · Retired    Current Meds   1  Allopurinol 300 MG Oral Tablet; TAKE 1 TABLET DAILY; Therapy: (Recorded:2015) to Recorded   2  Aspirin EC 81 MG Oral Tablet Delayed Release; TAKE 1 TABLET DAILY; Therapy: 93EMX3603 to [id number])  Requested for: 33GTP3246; Last   Rx:97Hby9927 Ordered   3  Atorvastatin Calcium 20 MG Oral Tablet; TAKE 1 TABLET DAILY; Therapy: 77MOU3375 to (Evaluate:60Waw5623)  Requested for: 35ZUM6980; Last   Rx:24Lhl9813 Ordered   4  Furosemide 20 MG Oral Tablet; TAKE 1 TABLET DAILY x 3 DAYS AND THEN AS   DIRECTED;    Therapy: 57BOA8302 to (Evaluate:2016)  Requested for: 62PSL8943; Last Rx:54Oqu2272 Ordered   5  Gabapentin 300 MG Oral Capsule; TAKE 2 CAPSULES 3 TIMES DAILY; Therapy: 18IYQ7987 to [id number])  Requested for: 47MWY4848; Last   Rx:01Oct2015; Status: ACTIVE - Renewal Denied Ordered   6  Pantoprazole Sodium 40 MG Oral Tablet Delayed Release; take 1 tablet once daily; Therapy: 82KOT7229 to (Evaluate:49Dhn9823)  Requested for: 29Apr2016; Status: ACTIVE   - Renewal Voided Recorded   7  Tamsulosin HCl - 0 4 MG Oral Capsule; Take 1 capsule one-half hour before same meal   daily; Therapy: 63MJT9719 to (Evaluate:37Zrw9340)  Requested for: 54Oph8392; Last   Rx:42Nga9986 Ordered    Allergies    1  Oxycodone-Acetaminophen CAPS    2  No Known Environmental Allergies   3  No Known Food Allergies    Health Management  Health Maintenance   Medicare Annual Wellness Visit; every 1 year; Next Due: 52ECZ2351; Overdue    Future Appointments    Date/Time Provider Specialty Site   06/30/2016 07:30 AM Hua Hudson MD General Surgery [facility] 96 OR   07/13/2016 01:00 PM Hua Hudson MD General Surgery [facility] SURGICAL [facility]   08/15/2016 11:45 AM BELLA Higginbotham , [facility] Hematology Oncology [address] [place] [address] MEDICAL ONCOLOGY   06/24/2016 02:30 PM BELLA Alexander  Gastroenterology [facility] GASTROENTEROLOGY [facility] REHABILITATION   09/06/2016 02:30 PM Jonel Garber MD Internal Medicine [facility]   06/21/2016 02:30 PM BELLA Patel  Neurology [facility]     Signatures   Electronically signed by : Raine Garzon, ; May 31 2016 10:34AM EST                       (Author)    Electronically signed by :  Nichol Foster MD; Jun 18 2016  6:30PM EST

## 2018-01-16 NOTE — CONSULTS
Assessment    1  Cervical stenosis of spine (723 0) (M48 02)   2  Lumbar radiculopathy (724 4) (M54 16)   3  Lumbar stenosis with neurogenic claudication (724 03) (M48 06)   4  History of Lumbar canal stenosis (724 02) (M48 06)   5  Thoracic myelopathy (721 41) (M47 14)   6  Neuropathy (355 9) (G62 9)   7  Spinal cord compression (336 9) (G95 20)   8  Spondylosis of lumbar region without myelopathy or radiculopathy (721 3) (M47 816)   9  Chronic kidney disease, stage IV (severe) (585 4) (N18 4)    Plan  Cervical spondylosis, Chronic lumbar radiculopathy, Disc degeneration, lumbar,  Neuropathy, Screening for neurological condition    · EMG TWO EXTREMITIES WITH OR W/O RELATED PARASPINAL AREAS; Status:Active; Requested IBT:[id number];    Perform:[facility]; AOQ:[id number]; Last Updated By:Samantha Arizmendi; 6/21/2016 4:12:11 PM;Ordered; For:Cervical spondylosis, Chronic lumbar radiculopathy, Disc degeneration, lumbar, Neuropathy, Screening for neurological condition; Ordered By:Refugio Byrd;  TWO : RLE  ONE : LLE  Chronic kidney disease, stage IV (severe)    · 1 - Link Fulling  (Nephrology) Physician Referral  Consult  Status: Active   Requested for: [id number]   Ordered; For: Chronic kidney disease, stage IV (severe); Ordered By: Em Whipple Performed:  Due: 75ZWU9255  Care Summary provided  : Yes  Lumbar canal stenosis    · Gabapentin 300 MG Oral Capsule; 2 tabs po bid   Rx By: Em Whipple; Dispense: 90 Days ; #:360 Capsule; Refill: 1; For: Lumbar canal stenosis; MARISSA = N; Verified Transmission to [facility]/PHARMACY #[id number] Last Updated By: System, [employer]; 6/24/2016 7:13:52 AM  Neuropathy    · (1) HEMOGLOBIN A1C; Status:Active; Requested GFF:[id number];    Perform:[facility]; Due:21Jun2017;Ordered;  For:Neuropathy; Ordered By:Refugio Byrd;   · (1) LYME ANTIBODY PROFILE W/REFLEX TO WESTERN BLOT; Status:Active;  Requested  GKJ:[id number];    Perform:[facility]; Due:21Jun2017;Ordered;  For:Neuropathy; Ordered Isai Reynolds;   · (1) 4[address] Kang Echols; Status:Active; Requested FYQ:[id number];    Perform:[facility]; Due:21Jun2017;Ordered;  For:Neuropathy; Ordered By:Palmira Byrd;   · (1) SJOGREN'S ANTIBODIES; Status:Active; Requested XXB:[id number];    Perform:[facility]; Due:21Jun2017;Ordered;  For:Neuropathy; Ordered By:Palmira Byrd;   · (1) VITAMIN B12; Status:Active; Requested ECJ:[id number];    Perform:[facility]; Due:21Jun2017;Ordered;  For:Neuropathy; Ordered By:Palmira Byrd;   · (1) VITAMIN B6; Status:Active; Requested YUD:[id number];    Perform:[facility]; XTF:[id number];XIPQCOY;  For:Neuropathy; Ordered By:Palmira Byrd;   · * MRI LUMBAR SPINE WO CONTRAST; Status:Need Information - Financial  Authorization; Requested FCN:[id number];    Perform:[facility]; DBB:[id number]; Last Updated By:Samantha Arizmendi; 6/21/2016 4:11:21 PM;Ordered;  For:Neuropathy; Ordered By:Palmira Byrd;   · * MRI THORACIC SPINE WO CONTRAST; Status:Need Information - Financial  Authorization;  Requested XHW:[id number];    Perform:[facility]; GIP:[id number]; Last Updated By:Samantha Arizmendi; 6/21/2016 4:11:44 PM;Ordered;  For:Neuropathy; Ordered By:Palmira Byrd;    Discussion/Summary  Discussion Summary:   Pt here for eval of his neuropathy  pt had 2 lumbar disc surgeries in the past  pt had most major t and l spine surgery with dr Emma Ronquillo in feb 2016  rev mri from 2014 with multilevel lumbar disc disease as well as foraminal narrowing  also pt with superimposed cord compression at t11  pt had one bout of urine and bowel retention  pt to have updated mri lumbar and t spine  pt with evidence of neuropathy as well as myelopathic changes on exam  needs emg of the lower ext to eval neuroapthy on peripheral vs central basis  rec updated labs  pt to followup after mris and emg  rec increaseing gabapentin to 600 mg tid  after review of all his labs, i will take him back to bid dosing as he seems to have some underlying kidney issues  also rec seeing renal to address neruopathic meds for the future and also clearance of other medications  staff to help coordinate for him  pt has had cmps followed but i currently do not see renal follow up  pt may also have component of meralgia paresthetica of the right leg  to consider lidoderm patches as alternative as well [place] if renal recommends furhter reduction of the neurontin  follow up after studies  Medication Side Effects Reviewed: Possible side effects of new medications were reviewed with the patient/guardian today  Counseling Documentation With Imm: The patient was counseled regarding diagnostic results, instructions for management, risk factor reductions, prognosis, patient and family education, risks and benefits of treatment options  Chief Complaint  Chief Complaint Free Text Note Form: Patient presents for a consultation for numbness in feet and right thigh pain  History of Present Illness  HPI: PT IS A 67 YO M WITH PMH OF HTN,CAD, BYPASS, BACK SURGERY ABOUT ONE YR AGO WITH DR Thien Kim, WHO PRESENTS TODAY FOR EVAL OF HIS NEUROPATHY  PT NOTES HE HAS HAD ISSUES WITH LOW BACK PAIN FOR MANY YRS  PT NOTES HE HAS DONE VERY HEAVY LABOR OVER THE YRS AND BACK ISSUES ALL THE TIME  PT NOTES HE WAS A  FOR [employer] AND  AS WELL  PT NOTES HE WORKED AT [employer] [address] [employer]ST 32 YEARS  PT NOTES HE HAD SURGERY ON IS BACK AND FELT GREAT IMMEDIATELY BUT THEN WITHIN A DAY OR SO HE FELT BAD AGAIN AND HAD TO GO BACK FOR A SECOND SURGERY DUE TO INFECTION AT THE SITE  PT NOTES HOSPITALIATION FOR ABOUT 5 WEEKS  PT NOTES HE HAS HAD NUMBNESS AND PAIN IN BOTH FEET FOR YRS  PT NTOES HE FEELS LIKE HE IS WALKING ON [place]  PT NOTES WHEN HE IS WALKING THE PAIN RADIATES UP TO HIS HIPS AS WELL  PT NOTS SOME NUMBNESS OF THE RIGHT LATERAL THIGH AS WELL  PT NOTES THIS AREA IS ESPECIALLY SUPERSENSITIVE TO THE TOUCH  PT NOTES NO RECENT CHANGE IN BOWEL OR BLADDER   HE DID HAVE ONE BOUT OF SOME URINARY RETENTION BUT NO RECURRENCE THIS YR  PT HAD IMAGNG LAST DONE IN OCT 2015 WITH MRI T SPINE WITHOUT CONTRAST  SEVERE SPINAL STENOSIS AT THE LOWER T SPINE T11 - T12 AND T12 /L1 AS WELL AS T10- T11 WITH DISC HERNIATIONS AND ASSOCIATED CORD COMPRESSION AT T10-T11 AND T12-L1  SMALL AMOUNT OF HIGH SIGNAL WITHIN THE SPINAL CORD IS PRESENT AT T11 /T12 AND T12/L1 PRVIOULSY PRESENT ON THE PRIOR LUMAR MR LIKELY REPRESENTING MYELOMALACIA  REV IMAGING WITH PT  THIS WAS THE IMAGING PRIOR TO HIS SURGERY IN NOV REV LAST NOTE FROM NS [facility] ON MARCH 2016  PT HAD AN EXTENSIVE TL DECOMPRESSION AND FUSIN FOR CORD COMPRESSION/ STENOSIS DUE TO PROFOUND THORACOLUMBAR SPONDYLOSIS  PT WAS INITIALLY PLACED ON GABAPENTIN BY NS [facility] TO HELP WITH THE RESIDUAL PAIN AND PARESTHESIAS  REV DIFFERENTIAL WTH PT AS WELL FOR THE PAIN IN THE LEGS AND FEET  PT WITH BOTH H/O STENOSIS, AS WELL AS SCOLIOSIS AS WELL AS MULTI LUMBAR ROOT ISSUE AS WELL AS T CORD INVOLVEMENT AND 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 CONTRIBUTING TO HIS LOWER EXT SXS  PT MAY ALSO HAVE A 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 AS WELL  I ORIGINALLY TOLD PT WE COULD TRY INCREASE IN HIS GABAPENTIN TO HELP WITH THE NEUROPATHIC PAIN IN LEGS AND FEET  PT WAS GIVEN RX ABOUT 2 DAYS AGO  AFTER REVIEW OF ALL LABS, CONCERN ABOUT ANY FURTHER INCREASE UNTIL SEEN BY A RENAL DR FOR HIS LOW GFR  STAFF TO COORDINATE WITH PT RENAL CONSULTATION AS WELL  PT NEEDS UPDATED MRI T AND L SPINE SINCE SURGERY AS WELL AS EMG OF THE LOWER EXT TO HELP DIFFERENTIATE HIS SXS  PT TO FOLLOW UP AFTER IMAGING, EMG AND LABS FOR ANY OTHER CAUSES FOR NEUROPATHY  PRIOR MRI C SPINE FROM [facility] 2014, GRADE 1 ANTEROLISTHESIS OF C4 ON C5 WITH MULTI LEVEL SPONDYLOTIC NARROWING QUITE SEVERE AT MULTIPLE LEVELS MIKAEL C6/7 AND C3/4 AND C5/6  NO CORD SIGNAL ABN TO SUGGEST CORD EDEMA OR MYELOMALACIA  MRI LUMBAR FROM APRIL 2014, MOD TO SEVERE SPINAL STENOSIS AT T11/12   AND T12/L1, WITH CIRCUMFERENTIAL MASS EFFECT ON CHAZ SPINAL CORD WITH CORD EDEMA/ DEVELOPING MYELOMALACIA SINCE THAT TIME  POST DIPLACEMENT OF LEFT S1 NERVE ROOT , ENH WITHIN AND SURROUNDING THE S1 NERVE ROOT BEFORE IT EXITS THE NEURAL FORAMEN , SEVERE LEFT FORAMINAL NARROWING AND MOD TO SEVERE RIGHT FORAMINAL NARROWNG AT L5/S1  SEVERE RIGHT FORAMINAL NARROWING AND MOD TO SEVERE LEFT FORMANINAL NARROWING AT L4/5 , MOD TO SEVERE FORAMINAL NARROWING AT L3/4  RE REV THE SEVERITY OF BACK AND T SPINE FINDINGS PRIOR TO HIS SURGICAL INTERVENTION  PT STIL WITH RESIDUAL SXS, UPDATE IMAGNG AND LABS FOR FURTHER REVIEW  Review of Systems  Neurological ROS:   Constitutional: no fever, no chills, no recent weight gain, no recent weight loss, no complaints of feeling tired, no changes in appetite  HEENT:  no sinus problems, not feeling congested, no blurred vision, no dryness of the eyes, no eye pain, no hearing loss, no tinnitus, no mouth sores, no sore throat, no hoarseness, no dysphagia, no masses, no bleeding  Cardiovascular:  no chest pain or pressure, no palpitations present, the heart rate was not rapid or irregular, no swelling in the arms or legs, no poor circulation  Respiratory:  no unusual or persistant cough, no shortness of breath with or without exertion  Gastrointestinal:  no nausea, no vomiting, no diarrhea, no abdominal pain, no changes in bowel habits, no melena, no loss of bowel control  Genitourinary:  no incontinence, no feelings of urinary urgency, no increase in frequency, no urinary hesitancy, no dysuria, no hematuria  Musculoskeletal: pain while walking  Integumentary  no masses, no rash, no skin lesions, no livedo reticularis  Psychiatric:  no anxiety, no depression, no mood swings, no psychiatric hospitalizations, no sleep problems  Endocrine   no unusual weight loss or gain, no excessive urination, no excessive thirst, no hair loss or gain, no hot or cold intolerance, no menstrual period change or irregularity, no loss of sexual ability or drive, no erection difficulty, no nipple discharge  Hematologic/Lymphatic:  no unusual bleeding, no tendency for easy bruising, no clotting skin or lumps  Neurological General:  no headache, no nausea or vomiting, no lightheadedness, no convulsions, no blackouts, no syncope, no trauma, no photopsia, no increased sleepiness, no trouble falling asleep, no snoring, no awakening at night  Neurological Mental Status:  no confusion, no mood swings, no alteration or loss of consciousness, no difficulty expressing/understanding speech, no memory problems  Neurological Cranial Nerves:  no blurry or double vision, no loss of vision, no face drooping, no facial numbness or weakness, no taste or smell loss/changes, no hearing loss or ringing, no vertigo or dizziness, no dysphagia, no slurred speech  Neurological Motor findings include:  no tremor, no twitching, no cramping(pre/post exercise), no atrophy  Neurological Coordination: unsteadiness  Neurological Sensory: numbness and pain  Neurological Gait: difficulty walking  ROS Reviewed:  ROS REV WITH PT AT APPT   ROS reviewed  Active Problems    1  Acute bronchitis (466 0) (J20 9)   2  Aftercare following surgery of the musculoskeletal system (V58 78) (Z47 89)   3  Anemia (285 9) (D64 9)   4  ASCVD (arteriosclerotic cardiovascular disease) (429 2,440 9) (I25 10)   5  Benign essential hypertension (401 1) (I10)   6  Benign prostatic hypertrophy (600 00) (N40 0)   7  Bulging lumbar disc (722 10) (M51 26)   8  History of CABG   9  Carotid artery stenosis (433 10) (I65 29)   10  Cervical spondylosis (721 0) (M47 812)   11  Cervical stenosis of spine (723 0) (M48 02)   12  Chronic kidney disease, stage 3 (585 3) (N18 3)   13  Chronic kidney disease, stage IV (severe) (585 4) (N18 4)   14  Chronic lumbar radiculopathy (724 4) (M54 16)   15  Coronary artery disease (414 00) (I25 10)   16  Degenerative disc disease, cervical (722 4) (M50 30)   17   Diastolic dysfunction (429  9) (I51 9)   18  Disc degeneration, lumbar (722 52) (M51 36)   19  Dyspnea (786 09) (R06 00)   20  Esophageal reflux (530 81) (K21 9)   21  Gout (274 9) (M10 9)   22  Hypercalcemia (275 42) (E83 52)   23  Hyperlipidemia (272 4) (E78 5)   24  Iron deficiency anemia (280 9) (D50 9)   25  Joint pain, knee (719 46) (M25 569)   26  Long term use of drug (V58 69) (Z79 899)   27  Lumbago (724 2) (M54 5)   28  Lumbar canal stenosis (724 02) (M48 06)   29  Lumbar canal stenosis (724 02) (M48 06)   30  Lumbar canal stenosis (724 02) (M48 06)   31  History of Lumbar canal stenosis (724 02) (M48 06)   32  Lumbar disc herniation (722 10) (M51 26)   33  History of Lumbar radiculopathy (724 4) (M54 16)   34  Lumbar radiculopathy (724 4) (M54 16)   35  Lumbar spinal stenosis (724 02) (M48 06)   36  Lumbar stenosis with neurogenic claudication (724 03) (M48 06)   37  Mitral regurgitation (424 0) (I34 0)   38  Myalgia And Myositis (729 1)   39  Need for prophylactic vaccination against single diseases (V05 9) (Z23)   40  Need for prophylactic vaccination and inoculation against influenza (V04 81) (Z23)   41  Non-healing surgical wound, initial encounter (998 83) (T81 89XA)   42  Numbness (782 0) (R20 0)   43  Osteoarthritis of knee (715 36) (M17 9)   44  Peripheral arterial disease (443 9) (I73 9)   45  Premature ventricular contractions (PVCs) (VPCs) (427 69) (I49 3)   46  Pre-operative cardiovascular examination (V72 81) (Z01 810)   47  Scoliosis (737 30) (M41 9)   48  Screening for neurological condition (V80 09) (Z13 89)   49  Shortness of breath (786 05) (R06 02)   50  Sinus tachycardia (427 89) (R00 0)   51  Spinal cord compression (336 9) (G95 20)   52  Spinal deformity (756 10) (Q76 49)   53  Spondylosis of lumbar region without myelopathy or radiculopathy (721 3) (M47 816)   54  Syncope, unspecified syncope type (780 2) (R55)   55  Thoracic disc disorder with myelopathy (722 72) (M51 04)   56   Trigger index finger of left hand (727 03) (M65 322)   57  Umbilical hernia without obstruction and without gangrene (553 1) (K42 9)   58  Urinary frequency (788 41) (R35 0)   59  Visit for pre-operative examination (V72 84) (K42 433)    Past Medical History    1  History of anemia (V12 3) (Z86 2)   2  History of gastritis (V12 79) (Z87 19)   3  History of gastroesophageal reflux (GERD) (V12 79) (Z87 19)   4  History of hiatal hernia (V12 79) (Z87 19)   5  History of hypertension (V12 59) (Z86 79)   6  History of low back pain (V13 59) (Z87 39)   7  History of nausea (V12 79) (Z87 898)   8  History of Lumbar canal stenosis (724 02) (M48 06)   9  History of Lumbar canal stenosis (724 02) (M48 06)   10  History of Lumbar radiculopathy (724 4) (M54 16)   11  History of Lumbar radiculopathy (724 4) (M54 16)   12  History of Lumbar radiculopathy (724 4) (M54 16)   13  History of Myelopathy (336 9) (G95 9)   14  Need for prophylactic vaccination and inoculation against influenza (V04 81) (Z23)   15  History of Nephrolithiasis (592 0) (N20 0)   16  History of Non-healing surgical wound, subsequent encounter (V58 89,998 83)    (T81 89XD)   17  History of Prostate cancer (185) (C61)   18  History of Prostate Cancer (V10 46)   19  History of Tachycardia (785 0) (R00 0)   20  History of Thoracic disc disorder with myelopathy (722 72) (M51 04)   21  History of Vasovagal syncope (780 2) (R55)  Active Problems And Past Medical History Reviewed: The active problems and past medical history were reviewed and updated today  Surgical History    1  History of Back Surgery   2  History of CABG   3  History of Hand Incision Tendon Sheath Of A Finger   4  History of Hernia Repair Inguinal Unilateral   5  History of Knee Replacement   6  History of Laminectomy Lumbar   7  History of Neuroplasty Decompression Median Nerve At Carpal Tunnel   8  History of Spinal Diskectomy Lumbar   9  History of Wrist Surgery Left  Surgical History Reviewed:    The surgical history was reviewed and updated today  Family History  Mother    1  Family history of    2  Family history of Heart disease  Father    3  Family history of    4  Family history of cardiac disorder (V17 49) (Z82 49)  Other    5  Family history of Medical history unknown  Unknown    6  Family history of hypertension (V17 49) (Z82 49)   7  Family history of Prostate cancer  Family History Reviewed: The family history was reviewed and updated today  Social History    · Denied: History of Currently sexually active   · Denied: Drug Use   · Former smoker ( 82) (T91 096)   · Minimum alcohol consumption   · No known STD risk factors   · Retired  Social History Reviewed: The social history was reviewed and updated today  Current Meds   1  Allopurinol 300 MG Oral Tablet; TAKE 1 TABLET DAILY; Therapy: (Recorded:2015) to Recorded   2  Aspirin EC 81 MG Oral Tablet Delayed Release; TAKE 1 TABLET DAILY; Therapy: 80CBY1770 to [id number])  Requested for: 93ZII7042; Last   Rx:[id number] Ordered   3  Atorvastatin Calcium 20 MG Oral Tablet; TAKE 1 TABLET DAILY; Therapy: 17JJJ8367 to (Evaluate:49Avs7782)  Requested for: 18OXQ2736; Last   Rx:[id number] Ordered   4  Pantoprazole Sodium 40 MG Oral Tablet Delayed Release; take 1 tablet once daily; Therapy: 85LSP7963 to (Evaluate:70Pdd3412)  Requested for: 2016; Status: ACTIVE   - Renewal Voided Recorded   5  Tamsulosin HCl - 0 4 MG Oral Capsule; Take 1 capsule one-half hour before same meal   daily; Therapy: 11GYJ8360 to (Evaluate:98Quh8123)  Requested for: 80Cjv3445; Last   Rx:[id number] Ordered  Medication List Reviewed: The medication list was reviewed and updated today  Allergies    1  Oxycodone-Acetaminophen CAPS    2  No Known Environmental Allergies   3   No Known Food Allergies    Vitals  Signs [Data Includes: Current Encounter]   Recorded: 2016 02:43PM   Heart Rate: 82  Respiration: 14  Systolic: 698  Diastolic: 62    Physical Exam    Constitutional   General appearance: No acute distress, well appearing and well nourished  Eyes   Ophthalmoscopic examination: Vision is grossly normal  Gross visual field testing by confrontation shows no abnormalities  EOMI in both eyes  Conjunctivae clear  Eyelids normal palpebral fissures equal  Orbits exhibit normal position  No discharge from the eyes  PERRL  Musculoskeletal   Gait and station: Normal gait, stance and balance  Muscle strength: Normal strength throughout  Muscle tone: No atrophy, abnormal movements, flaccidity, cogwheeling or spasticity  Neurologic   Orientation to person, place, and time: Normal     Recent and remote memory: Demonstrates normal memory  Attention span and concentration: Normal thought process and attention span  Language: Names objects, able to repeat phrases and speaks spontaneously  Fund of knowledge: Normal vocabulary with appropriate knowledge of current events and past history  2nd cranial nerve: Normal     3rd, 4th, and 6th cranial nerves: Normal     5th cranial nerve: Normal     7th cranial nerve: Normal     8th cranial nerve: Normal     9th cranial nerve: Normal     11th cranial nerve: Normal     12th cranial nerve: Normal     Sensation: Abnormal   DEC VIB ERNESTINE LOWERS AND DEC TEMP LOWER 2/3 CALVES  Reflexes: Abnormal   SLIGHLTY INCREASED AT [facility] AND DECREAESED AT [facility]  Coordination: Normal        Results/Data  Diagnostic Studies Reviewed: I personally reviewed the films/images/results in the office today  My interpretation follows  Diagnostic Review SEE HPI  Results   (1) BASIC METABOLIC PROFILE 07CND1989 01:38PM [address]     Test Name Result Flag Reference   GLUCOSE,RANDM 96 mg/dL     If the patient is fasting, the ADA then defines impaired fasting glucose as > 100 mg/dL and diabetes as > or equal to 123 mg/dL     SODIUM 138 mmol/L  136-145   POTASSIUM 4 6 mmol/L  3 5-5 3   CHLORIDE 104 mmol/L  100-108   CARBON DIOXIDE 26 mmol/L  21-32   ANION GAP (CALC) 8 mmol/L  4-13   BLOOD UREA NITROGEN 30 mg/dL H 5-25   CREATININE 1 70 mg/dL H 0 60-1 30   Standardized to IDMS reference method   CALCIUM 9 1 mg/dL  8 3-10 1   eGFR Non-African American 39 3 ml/min/1 73sq m     [facility] Disease Education Program recommendations are as follows:  GFR calculation is accurate only with a steady state creatinine  Chronic Kidney disease less than 60 ml/min/1 73 sq  meters  Kidney failure less than 15 ml/min/1 73 sq  meters  (1) CBC/PLT/DIFF 72BXJ7915 01:38PM Dayhoit Reagan     Test Name Result Flag Reference   WBC COUNT 5 66 Thousand/uL  4 31-10 16   RBC COUNT 4 59 Million/uL  3 88-5 62   HEMOGLOBIN 11 0 g/dL L 12 0-17 0   HEMATOCRIT 37 4 %  36 5-49 3   MCV 82 fL  82-98   MCH 24 0 pg L 26 8-34 3   MCHC 29 4 g/dL L 31 4-37 4   RDW 28 2 % H 11 6-15 1   MPV 10 3 fL  8 9-12 7   PLATELET COUNT 555 Thousands/uL  149-390   nRBC AUTOMATED 0 /100 WBCs     NEUTROPHILS RELATIVE PERCENT 67 %  43-75   LYMPHOCYTES RELATIVE PERCENT 22 %  14-44   MONOCYTES RELATIVE PERCENT 7 %  4-12   EOSINOPHILS RELATIVE PERCENT 3 %  0-6   BASOPHILS RELATIVE PERCENT 1 %  0-1   NEUTROPHILS ABSOLUTE COUNT 3 88 Thousands/?L  1 85-7 62   LYMPHOCYTES ABSOLUTE COUNT 1 22 Thousands/?L  0 60-4 47   MONOCYTES ABSOLUTE COUNT 0 38 Thousand/?L  0 17-1 22   EOSINOPHILS ABSOLUTE COUNT 0 14 Thousand/?L  0 00-0 61   BASOPHILS ABSOLUTE COUNT 0 04 Thousands/?L  0 00-0 10     * XR Lumbosacral Spine Complete 4 Views (non-injury) 70QKC3059 11:56AM Shanda Mom     Test Name Result Flag Reference   XR LSpine>4 View (Report)     [address] [place];;[place];[address]   10/01/2015 1215   10/01/2015 1222   6 IMAGES     LUMBAR SPINE     INDICATION- M48 06  Spine surgery February, 2015  Persistent pain in   the legs       COMPARISON- August 20, 2015     VIEWS- AP, lateral, bilateral oblique and coned down projections& 6^ 6 images   Patient imaged standing     FINDINGS-     No suspicious malalignment seen  There may be subtle retrolisthesis of   L4 on L5  Posterior spinal fusion hardware is present from T9 to S1  The hardware appears stable from the previous exam  Bilateral pedicle   screws are present at each level  There are 2 cross bars at about the   T11 level and the L4-L5 level  No gross evidence of lucency around the   hardware elements  No significant compression deformities are appreciated  Stable multilevel degenerative changes throughout the imaged region   with disc space narrowing at multiple levels, most severe at L2-L3  Multilevel laminectomy noted  There is aortic calcification  No acute soft tissue abnormality   appreciated  There may be a right-sided kidney stone or perhaps   contrast within the right colonic diverticulum  IMPRESSION-     Stable appearance of spinal hardware and postoperative changes   thoracolumbar spine  No acute abnormality seen  Transcribed on- [id number]     - JOHN James MD   [place] Radiologist- JOHN James MD   Electronically Signed- JOHN James MD   Released Date Time- 10/01/15 1603   ------------------------------------------------------------------------------   [id number]^ZANDER ARREDONDO TREND   [id number]^ZANDER ARREDONDO TREND     * MRI Cervical Spine Without Contrast 15Sep2014 01:37PM Roc Fuller     Test Name Result Flag Reference   MRI CSpine W/O (Report)     [address] [place];;[place];[address]  09/15/2014 1405  09/15/2014 1459  N/A    MRI CERVICAL SPINE WITHOUT CONTRAST    INDICATION- 724  4  Persistent neck and back pain  COMPARISON- None  TECHNIQUE- Sagittal T1, sagittal T2, sagittal inversion recovery,  axial T2, axial         IMAGE QUALITY- Diagnostic    FINDINGS-    ALIGNMENT- Mild grade 1 anterolisthesis of C4 on C5      MARROW SIGNAL- Scattered multilevel degenerative endplate changes  noted  No compression abnormality or bone marrow edema  No focally  suspicious marrow lesions  CERVICAL AND VISUALIZED THORACIC CORD- Normal signal within the  visualized cord  PREVERTEBRAL AND PARASPINAL SOFT TISSUES- Prevertebral and paraspinal  soft tissues are unremarkable  VISUALIZED POSTERIOR FOSSA- The visualized posterior fossa  demonstrates no abnormal signal     CERVICAL DISC SPACES-       C2-C3- Mild left uncovertebral hypertrophy  Mild left neural  foraminal narrowing  Central canal mildly narrowed  Right neural  foramen patent  C3-C4- There is uncovering of the intervertebral disc space  There is  a disc osteophyte complex which effaces the ventral thecal sac  AP  diameter of the central canal is 7-8 mm  There is severe central canal  narrowing  Severe bilateral neural foraminal narrowing  C4-C5- There is a disc osteophyte complex  There is moderate  tricompartmental narrowing  C5-C6- There is a disc osteophyte complex  There is moderate to  severe tricompartmental narrowing  C6-C7- There is a disc osteophyte complex with flattening of the  ventral aspect of the spinal cord but no cord signal abnormality  AP  diameter of the central canal is only 6 mm  Severe tricompartmental  narrowing  C7-T1- There is a diffuse disc bulge  There is mild to moderate  tricompartmental narrowing  UPPER THORACIC DISC SPACES- At T1-T2 there is a small central disc  herniation, protrusion type  There is mild central canal narrowing  Neural foramina bilaterally patent  IMPRESSION-    1  Grade 1 anterolisthesis of C4 on C5 with multilevel spondylotic  narrowing quite severe at multiple levels as detailed above, perhaps  most severe at C6-C7 as well as C3-C4 and C5-C6  No cord signal  abnormality to suggest cord edema or myelomalacia      Transcribed on- [id number]    JOHN Brasher MD  Reading Radiologist- Alexandro Holter, RAD MD  Electronically JOHN Alaniz MD  Released Date Time- 09/16/14 1230  ------------------------------------------------------------------------------  [id number]^ANDREW Othel Krabbe  [address]^[facility] [address] [facility] MRI Thoracic Spine Without Contrast [id number] 01:37PM Nash Thompson     Test Name Result Flag Reference   MRI TSpine W/O (Report)     [address];;[place];[address]  09/15/2014 1405  09/15/2014 1459  N/A    MRI THORACIC SPINE WITHOUT CONTRAST    INDICATION- 724  4  Persistent neck and back pain  COMPARISON- Lumbar MRI 06/20/2013 which included the thoracolumbar  junction  TECHNIQUE- Sagittal T1, sagittal T2, sagittal inversion recovery,  axial T2, axial 2D merge  IMAGE QUALITY- Diagnostic  FINDINGS-    ALIGNMENT- Mild lordotic angulation at the thoracolumbar junction  centered at the T9 level, without subluxation  MARROW SIGNAL- Mild anterior wedging of T5 without bone marrow edema  indicative of chronic compression abnormality  Scattered multilevel  degenerative endplate changes noted  THORACIC CORD- At the T11-T12 level the spinal cord is focally  compressed by adjacent disc herniation with a small amount of linear  high signal, similar to the previous lumbar MR, likely reflective of  myelomalacia  Similar findings at T12-L1  PARAVERTEBRAL SOFT TISSUES- Small hiatal hernia  THORACIC DEGENERATIVE CHANGE- Multilevel degenerative endplate changes  noted  T4-T5 there is a tiny left paracentral disc protrusion without  significant central canal or neural foraminal narrowing  T5-T6 miniscule central disc protrusion without significant central  canal or neural foraminal narrowing  T6-T7- Small central/right paracentral disc herniation, protrusion  type  Mild central canal narrowing  Neural foramina bilaterally  patent  T7-T8- Small central disc herniation, protrusion type  Mild central  canal narrowing  Neuroforamina bilaterally patent      T9-T10 small left paracentral disc herniation, protrusion type  Mild  central canal narrowing  Neural foramina bilaterally patent  T10-T11- There is a central disc herniation, protrusion type extending  eccentrically towards the right  There is severe central canal and  right neural foraminal narrowing  Moderate left neural foraminal  narrowing  T11-T12- There is bilateral facet hypertrophy  There may be a  right-sided facet cyst as well  There is a central/left paracentral  disc protrusion with associated severe central canal narrowing and cord  compression  Severe left neural foraminal narrowing  Moderate right  neural foraminal narrowing  T12-L1- Central disc herniation, extrusion type  Herniated disc  material extends caudally along the dorsal margin of L1  There is  severe central canal narrowing  Small amount of cord signal likely  myelomalacia  There is bilateral facet hypertrophy  Moderate  bilateral neural foraminal narrowing  Other levels are free of disc herniation  IMPRESSION-    1  Severe spinal stenosis at the lower thoracic spine T11-T12 and  T12-L1 as well as T10-T11 with disc herniations and associated cord  compression at T10-T11 and T12-L1  Small amount of high signal within  the spinal cord is present at T11-T12 and T12-L1, previously present on  the prior lumbar MR likely reflecting myelomalacia        ##imslh##imslh          Transcribed on- [id number]    JOHN Mclaughlin MD  Reading Radiologist- JOHN Chaudhry MD  Electronically Signed- JOHN Chaudhry MD  Released Date Time- 09/16/14 1246  ------------------------------------------------------------------------------  26828^BDAYAL Brinda Musa  [address]108^PEIJBC Brinda Musa     Future Appointments    Date/Time Provider Specialty Site   06/30/2016 07:30 AM Kim Fair MD General Surgery [facility] [address] OR   07/13/2016 01:00 PM Kim Fair MD General Surgery [facility] 08/15/2016 11:45 AM BELLA Mooney , [facility] Hematology Oncology [facility]   08/30/2016 02:00 PM Bonny David, [facility] Neurology [facility]   06/24/2016 02:30 PM BELLA Crooks   Gastroenterology Adult  [address] [facility] GASTROENTEROLOGY [facility]   09/06/2016 02:30 PM Cassandra Goodwin MD Internal Medicine [facility]     Signatures   Electronically signed by : BELLA Padgett ; Jun 24 2016  7:21AM EST                       (Author)

## 2018-01-16 NOTE — MISCELLANEOUS
Chief Complaint  Chief Complaint Free Text Note Form: The patient's wife, Manuel Marie, declined to make the patient a [facility] appointment because she said he would be unable to come in  Active Problems    1  Acute bronchitis (466 0) (J20 9)   2  Aftercare following surgery of the musculoskeletal system (V58 78) (Z47 89)   3  Anemia (285 9) (D64 9)   4  ASCVD (arteriosclerotic cardiovascular disease) (429 2,440 9) (I25 10)   5  Benign essential hypertension (401 1) (I10)   6  Benign prostatic hypertrophy (600 00) (N40 0)   7  Bulging lumbar disc (722 10) (M51 26)   8  History of CABG   9  Carotid artery stenosis (433 10) (I65 29)   10  Cervical spondylosis (721 0) (M47 812)   11  Cervical stenosis of spine (723 0) (M48 02)   12  Chronic kidney disease, stage 3 (585 3) (N18 3)   13  Chronic kidney disease, stage IV (severe) (585 4) (N18 4)   14  Chronic lumbar radiculopathy (724 4) (M54 16)   15  Coronary artery disease (414 00) (I25 10)   16  Degenerative disc disease, cervical (722 4) (M50 30)   17  Diastolic dysfunction (206 2) (I51 9)   18  Disc degeneration, lumbar (722 52) (M51 36)   19  Dyspnea (786 09) (R06 00)   20  Esophageal reflux (530 81) (K21 9)   21  Gout (274 9) (M10 9)   22  Hypercalcemia (275 42) (E83 52)   23  Hyperlipidemia (272 4) (E78 5)   24  Iron deficiency anemia (280 9) (D50 9)   25  Joint pain, knee (719 46) (M25 569)   26  Long term use of drug (V58 69) (Z79 899)   27  Lumbago (724 2) (M54 5)   28  Lumbar canal stenosis (724 02) (M48 06)   29  Lumbar canal stenosis (724 02) (M48 06)   30  Lumbar canal stenosis (724 02) (M48 06)   31  History of Lumbar canal stenosis (724 02) (M48 06)   32  Lumbar disc herniation (722 10) (M51 26)   33  History of Lumbar radiculopathy (724 4) (M54 16)   34  Lumbar radiculopathy (724 4) (M54 16)   35  Lumbar spinal stenosis (724 02) (M48 06)   36  Lumbar stenosis with neurogenic claudication (724 03) (M48 06)   37  Mitral regurgitation (424 0) (I34 0)   38  Myalgia And Myositis (729 1)   39  Myelomalacia (336 8) (G95 89)   40  Need for prophylactic vaccination against single diseases (V05 9) (Z23)   41  Need for prophylactic vaccination and inoculation against influenza (V04 81) (Z23)   42  Neuropathy (355 9) (G62 9)   43  Non-healing surgical wound, initial encounter (998 83) (T81 89XA)   44  Numbness (782 0) (R20 0)   45  Osteoarthritis of knee (715 36) (M17 9)   46  Peripheral arterial disease (443 9) (I73 9)   47  Premature ventricular contractions (PVCs) (VPCs) (427 69) (I49 3)   48  Pre-operative cardiovascular examination (V72 81) (Z01 810)   49  Scoliosis (737 30) (M41 9)   50  Screening for genitourinary condition (V81 6) (Z13 89)   51  Screening for neurological condition (V80 09) (Z13 89)   52  Shortness of breath (786 05) (R06 02)   53  Sinus tachycardia (427 89) (R00 0)   54  Spinal cord compression (336 9) (G95 20)   55  Spinal deformity (756 10) (Q76 49)   56  Spondylosis of lumbar region without myelopathy or radiculopathy (721 3) (M47 816)   57  Status post umbilical hernia repair, follow-up exam (V67 09) (N02)   58  Syncope, unspecified syncope type (780 2) (R55)   59  Thoracic disc disorder with myelopathy (722 72) (M51 04)   60  Thoracic myelopathy (721 41) (M47 14)   61  Trigger index finger of left hand (727 03) (M65 322)   62  Umbilical hernia without obstruction and without gangrene (553 1) (K42 9)   63  Urinary frequency (788 41) (R35 0)   64  Visit for pre-operative examination (V72 84) (P04 034)    Past Medical History    1  History of anemia (V12 3) (Z86 2)   2  History of gastritis (V12 79) (Z87 19)   3  History of gastroesophageal reflux (GERD) (V12 79) (Z87 19)   4  History of hiatal hernia (V12 79) (Z87 19)   5  History of hypertension (V12 59) (Z86 79)   6  History of low back pain (V13 59) (Z87 39)   7  History of nausea (V12 79) (Z87 898)   8  History of Lumbar canal stenosis (724 02) (M48 06)   9   History of Lumbar canal stenosis (724 02) (M48 06)   10  History of Lumbar radiculopathy (724 4) (M54 16)   11  History of Lumbar radiculopathy (724 4) (M54 16)   12  History of Lumbar radiculopathy (724 4) (M54 16)   13  History of Myelopathy (336 9) (G95 9)   14  Need for prophylactic vaccination and inoculation against influenza (V04 81) (Z23)   15  History of Nephrolithiasis (592 0) (N20 0)   16  History of Non-healing surgical wound, subsequent encounter (V58 89,998 83)    (T81 89XD)   17  History of Prostate cancer (185) (C61)   18  History of Prostate Cancer (V10 46)   19  History of Tachycardia (785 0) (R00 0)   20  History of Thoracic disc disorder with myelopathy (722 72) (M51 04)   21  History of Vasovagal syncope (780 2) (R55)    Surgical History    1  History of Back Surgery   2  History of CABG   3  History of Complete Colonoscopy   4  History of Diagnostic Esophagogastroduodenoscopy   5  History of Hand Incision Tendon Sheath Of A Finger   6  History of Hernia Repair Inguinal Unilateral   7  History of Knee Replacement   8  History of Laminectomy Lumbar   9  History of Neuroplasty Decompression Median Nerve At Carpal Tunnel   10  History of Spinal Diskectomy Lumbar   11  History of Umbilical Hernia Repair - Over Age 6   17  History of Wrist Surgery Left    Family History  Mother    1  Denied: Family history of Colon cancer   2  Denied: Family history of Crohn's disease without complication, unspecified   gastrointestinal tract location   3  Family history of    4  Denied: Family history of liver disease   5  Family history of Heart disease  Father    10  Denied: Family history of Colon cancer   7  Denied: Family history of Crohn's disease without complication, unspecified   gastrointestinal tract location   8  Family history of    5  Family history of cardiac disorder (V17 49) (Z82 49)   10  Denied: Family history of liver disease  Other    11  Family history of Medical history unknown  Unknown    12   Family history of hypertension (V17 49) (Z82 49)   13  Family history of Prostate cancer    Social History    · Denied: History of Currently sexually active   · Denied: Drug Use   · Former smoker (V15 82) (P53 005)   · Minimum alcohol consumption   · No known STD risk factors   · Retired    Current Meds   1  Allopurinol 300 MG Oral Tablet; TAKE 1 TABLET DAILY; Therapy: (Recorded:26Jan2015) to Recorded   2  Amitriptyline HCl - 10 MG Oral Tablet; TAKE 1 TABLET AT BEDTIME; Therapy: 75OFC6511 to (Evaluate:02Mar2017)  Requested for: 63NVK5603; Last   Rx:02Nov2016 Ordered   3  Aspirin EC 81 MG Oral Tablet Delayed Release; TAKE 1 TABLET DAILY; Therapy: 07URP2405 to [id number])  Requested for: 40IVK6213; Last   Rx:90Vgi9660 Ordered   4  Atorvastatin Calcium 20 MG Oral Tablet; TAKE 1 TABLET DAILY; Therapy: 60OUI2246 to (Evaluate:18Sep2017)  Requested for: 63MUL2319; Last   Rx:37Gva9259 Ordered   5  Azithromycin 250 MG Oral Tablet; TAKE 2 TABLETS ON DAY 1 THEN TAKE 1 TABLET A   DAY FOR 4 DAYS; Therapy: 98RGZ2570 to (Last Rx:14Oct2016)  Requested for: 14Oct2016 Ordered   6  Gabapentin 300 MG Oral Capsule; 2 tabs po bid; Therapy: 21Jun2016 to (Evaluate:78Veh4099)  Requested for: 53TXC6614; Last   SI:56TBT6203 Ordered   7  Pantoprazole Sodium 40 MG Oral Tablet Delayed Release; take 1 tablet once daily; Therapy: 98JLM6821 to (Evaluate:31Xtf9551)  Requested for: 29Apr2016; Status: ACTIVE   - Renewal Voided Recorded   8  Proventil  (90 Base) MCG/ACT Inhalation Aerosol Solution; INHALE 2 PUFFS   EVERY 4 HOURS AS NEEDED FOR COUGH AND WHEEZE;   Therapy: 14Oct2016 to (Last Rx:14Oct2016)  Requested for: 14Oct2016 Ordered   9  Tamsulosin HCl - 0 4 MG Oral Capsule; Take 1 capsule one-half hour before same meal   daily; Therapy: 18WSO6930 to (Evaluate:62Axw0981)  Requested for: 17Shs6721; Last   Rx:62Eio9519 Ordered    Allergies    1  Oxycodone-Acetaminophen CAPS    2  No Known Environmental Allergies   3   No Known Food Allergies    Health Management  Health Maintenance   Medicare Annual Wellness Visit; every 1 year; Next Due: 98HWA3035; Overdue    Future Appointments    Date/Time Provider Specialty Site   03/13/2017 03:30 PM BLELA Brunner , [facility] Hematology Oncology [facility] MEDICAL ONCOLOGY   03/14/2017 12:30 PM Alicia Denny, [facility] Neurology [address]   07/25/2017 01:30 PM BELLA Guajardo   Neurology [facility] [address]     Signatures   Electronically signed by : Keyona Montaño, ; Feb 10 2017 11:39AM EST                       (Author)    Electronically signed by : BELLA Abel ; Feb 10 2017  4:30PM EST                       (Review)

## 2018-01-16 NOTE — PROGRESS NOTES
History of Present Illness  Care Coordination Encounter Information:   Type of Encounter: Telephonic    Spoke to Patient  Care Coordination SL Nurse [facility]:   The reason for call is to discuss outreach for follow up/needed services  Spoke to patient about missed appointment with Dr Ericka Snyder on 9/6  Patient stated Dr Ericka Snyder is leaving and he is looking for a new PCP to follow up with  Stated he has one in mind, did not want to see other physicians in the group as he states he doesn't know them  Acknowledged appointment with Dr Brenda Epstein on 9/12 and is awaiting a call back from GI for further studies  No other needs or concerns at this time, left my contact information with patient  Active Problems    1  Acute bronchitis (466 0) (J20 9)   2  Aftercare following surgery of the musculoskeletal system (V58 78) (Z47 89)   3  Anemia (285 9) (D64 9)   4  ASCVD (arteriosclerotic cardiovascular disease) (429 2,440 9) (I25 10)   5  Benign essential hypertension (401 1) (I10)   6  Benign prostatic hypertrophy (600 00) (N40 0)   7  Bulging lumbar disc (722 10) (M51 26)   8  History of CABG   9  Carotid artery stenosis (433 10) (I65 29)   10  Cervical spondylosis (721 0) (M47 812)   11  Cervical stenosis of spine (723 0) (M48 02)   12  Chronic kidney disease, stage 3 (585 3) (N18 3)   13  Chronic kidney disease, stage IV (severe) (585 4) (N18 4)   14  Chronic lumbar radiculopathy (724 4) (M54 16)   15  Coronary artery disease (414 00) (I25 10)   16  Degenerative disc disease, cervical (722 4) (M50 30)   17  Diastolic dysfunction (135 4) (I51 9)   18  Disc degeneration, lumbar (722 52) (M51 36)   19  Dyspnea (786 09) (R06 00)   20  Esophageal reflux (530 81) (K21 9)   21  Gout (274 9) (M10 9)   22  Hypercalcemia (275 42) (E83 52)   23  Hyperlipidemia (272 4) (E78 5)   24   Iron deficiency anemia (280 9) (D50 9)   25  Joint pain, knee (719 46) (M25 569)   · Left - 10/24/12    26  Long term use of drug (E24 88) (Z79 899)   27  Lumbago (724 2) (M54 5)   28  Lumbar canal stenosis (724 02) (M48 06)   29  Lumbar canal stenosis (724 02) (M48 06)   30  Lumbar canal stenosis (724 02) (M48 06)   31  History of Lumbar canal stenosis (724 02) (M48 06)   32  Lumbar disc herniation (722 10) (M51 26)   33  History of Lumbar radiculopathy (724 4) (M54 16)   34  Lumbar radiculopathy (724 4) (M54 16)   35  Lumbar spinal stenosis (724 02) (M48 06)   36  Lumbar stenosis with neurogenic claudication (724 03) (M48 06)   37  Mitral regurgitation (424 0) (I34 0)   38  Myalgia And Myositis (729 1)   39  Need for prophylactic vaccination against single diseases (V05 9) (Z23)   40  Need for prophylactic vaccination and inoculation against influenza (V04 81) (Z23)   41  Neuropathy (355 9) (G62 9)   42  Non-healing surgical wound, initial encounter (998 83) (T81 89XA)   43  Numbness (782 0) (R20 0)   44  Osteoarthritis of knee (715 36) (M17 9)   45  Peripheral arterial disease (443 9) (I73 9)   46  Premature ventricular contractions (PVCs) (VPCs) (427 69) (I49 3)   47  Pre-operative cardiovascular examination (V72 81) (Z01 810)   48  Scoliosis (737 30) (M41 9)   49  Screening for neurological condition (V80 09) (Z13 89)   50  Shortness of breath (786 05) (R06 02)   51  Sinus tachycardia (427 89) (R00 0)   52  Spinal cord compression (336 9) (G95 20)   53  Spinal deformity (756 10) (Q76 49)   54  Spondylosis of lumbar region without myelopathy or radiculopathy (721 3) (M47 816)   55  Status post umbilical hernia repair, follow-up exam (V67 09) (Q99)   56  Syncope, unspecified syncope type (780 2) (R55)   57  Thoracic disc disorder with myelopathy (722 72) (M51 04)   58  Thoracic myelopathy (721 41) (M47 14)   59  Trigger index finger of left hand (727 03) (T44 693)   ·   [facility] - 6/7179    60  Umbilical hernia without obstruction and without gangrene (553 1) (K42 9)   61  Urinary frequency (788 41) (R35 0)   62   Visit for pre-operative examination (V72 84) (U80 071)    Past Medical History    1  History of anemia (V12 3) (Z86 2)   2  History of gastritis (V12 79) (Z87 19)   3  History of gastroesophageal reflux (GERD) (V12 79) (Z87 19)   4  History of hiatal hernia (V12 79) (Z87 19)   5  History of hypertension (V12 59) (Z86 79)   6  History of low back pain (V13 59) (Z87 39)   7  History of nausea (V12 79) (Z87 898)   8  History of Lumbar canal stenosis (724 02) (M48 06)   9  History of Lumbar canal stenosis (724 02) (M48 06)   10  History of Lumbar radiculopathy (724 4) (M54 16)   11  History of Lumbar radiculopathy (724 4) (M54 16)   12  History of Lumbar radiculopathy (724 4) (M54 16)   13  History of Myelopathy (336 9) (G95 9)   14  Need for prophylactic vaccination and inoculation against influenza (V04 81) (Z23)   15  History of Nephrolithiasis (592 0) (N20 0)   16  History of Non-healing surgical wound, subsequent encounter (V58 89,998 83)    (T81 89XD)   17  History of Prostate cancer (185) (C61)   18  History of Prostate Cancer (V10 46)   19  History of Tachycardia (785 0) (R00 0)   20  History of Thoracic disc disorder with myelopathy (722 72) (M51 04)   21  History of Vasovagal syncope (780 2) (R55)    Surgical History    1  History of Back Surgery   2  History of CABG   3  History of Complete Colonoscopy   4  History of Diagnostic Esophagogastroduodenoscopy   5  History of Hand Incision Tendon Sheath Of A Finger   6  History of Hernia Repair Inguinal Unilateral   7  History of Knee Replacement   8  History of Laminectomy Lumbar   9  History of Neuroplasty Decompression Median Nerve At Carpal Tunnel   10  History of Spinal Diskectomy Lumbar   11  History of Umbilical Hernia Repair - Over Age 6   17  History of Wrist Surgery Left    Family History  Mother    1  Denied: Family history of Colon cancer   2  Denied: Family history of Crohn's disease without complication, unspecified   gastrointestinal tract location   3   Family history of    4  Denied: Family history of liver disease   5  Family history of Heart disease  Father    10  Denied: Family history of Colon cancer   7  Denied: Family history of Crohn's disease without complication, unspecified   gastrointestinal tract location   8  Family history of    5  Family history of cardiac disorder (V17 49) (Z82 49)   10  Denied: Family history of liver disease  Other    11  Family history of Medical history unknown  Unknown    12  Family history of hypertension (V17 49) (Z82 49)   13  Family history of Prostate cancer    Social History    · Denied: History of Currently sexually active   · Denied: Drug Use   · Former smoker (V1 82) (T41 572)   · Minimum alcohol consumption   · No known STD risk factors   · Retired    Current Meds    1  Atorvastatin Calcium 20 MG Oral Tablet; TAKE 1 TABLET DAILY; Therapy: 42YFL7508 to (Evaluate:04Ayk3140)  Requested for: 66PAF0625; Last   Rx:05Ntu0447 Ordered    2  Aspirin EC 81 MG Oral Tablet Delayed Release; TAKE 1 TABLET DAILY; Therapy: 61CRH9524 to [phone])  Requested for: 18JWW6410; Last   Rx:15Qli0662 Ordered    3  Allopurinol 300 MG Oral Tablet; TAKE 1 TABLET DAILY; Therapy: (TVGSCTGA:37DXH7111) to Recorded    4  Gabapentin 300 MG Oral Capsule; 2 tabs po bid; Therapy: 2016 to (Evaluate:24Krq8747)  Requested for: 99GFE7797; Last   YA:17RPC5188 Ordered    5  Tamsulosin HCl - 0 4 MG Oral Capsule; Take 1 capsule one-half hour before same meal   daily; Therapy: 19PLQ8818 to (Evaluate:00Kpx0940)  Requested for: 01Yln6642; Last   Rx:03Ygj6604 Ordered    6  Pantoprazole Sodium 40 MG Oral Tablet Delayed Release; take 1 tablet once daily; Therapy: 64KVI1921 to (Evaluate:81Hbb7686)  Requested for: 41Asm6897; Status: ACTIVE   - Renewal Voided Recorded    Allergies    1  Oxycodone-Acetaminophen CAPS    2  No Known Environmental Allergies   3   No Known Food Allergies    Health Management   Medicare Annual Wellness Visit; every 1 year; Next Due: 88Kny5541; Overdue    End of Encounter Meds    1  Atorvastatin Calcium 20 MG Oral Tablet; TAKE 1 TABLET DAILY; Therapy: 49EDG6582 to (Evaluate:70Sid6773)  Requested for: 35OCY8689; Last   Rx:22Zmo6430 Ordered    2  Aspirin EC 81 MG Oral Tablet Delayed Release; TAKE 1 TABLET DAILY; Therapy: 80YUU8826 to [id number])  Requested for: 30OZL6494; Last   Rx:81Wri9674 Ordered    3  Allopurinol 300 MG Oral Tablet; TAKE 1 TABLET DAILY; Therapy: (WGPSOBPH:95VWR7093) to Recorded    4  Gabapentin 300 MG Oral Capsule; 2 tabs po bid; Therapy: 38Cuu3686 to (Evaluate:18Wgk6396)  Requested for: 71GIX4748; Last   UP:77XXP9261 Ordered    5  Tamsulosin HCl - 0 4 MG Oral Capsule; Take 1 capsule one-half hour before same meal   daily; Therapy: 08TPN1471 to (Evaluate:40Qqf4113)  Requested for: 00Xvf0939; Last   Rx:43Qnv5519 Ordered    6  Pantoprazole Sodium 40 MG Oral Tablet Delayed Release; take 1 tablet once daily; Therapy: 84SSW4435 to (Evaluate:90Trb8599)  Requested for: 81Soy6601; Status: ACTIVE   - Renewal Voided Recorded    Future Appointments    Date/Time Provider Specialty Site   09/12/2016 03:15 PM BELLA Infante , [facility] Hematology Oncology [facility] MEDICAL ONCOLOGY   10/11/2016 01:15 PM [facility] [place] [id number]     Patient Care Team    Care Team Member Role Specialty Office Number   Babs Bear MD Specialist Rheumatology [phone]   Mariajose De Jesus MD Specialist Orthopedic Surgery [phone]   Chao Bazan MD Specialist Orthopedic Surgery [phone]   Maria C Stein MD Specialist Orthopedic Surgery [phone] [address] Specialist Rheumatology [phone]   Mana Duarte MD Specialist Orthopedic Surgery [phone]   Noy ARREDONDO   Specialist Neurosurgery [phone]   [address] [place] [address] Specialist Anesthesia [phone]   Foster Cerna [facility] Specialist Neurosurgery [phone] Carlos Hernandez MD Specialist General Surgery [phone]   Joon ARREDONDO  Specialist Cardiology [phone]   Ruth ARREDONDO  Specialist Gastroenterology Adult [phone]   Fernando ARREDONDO , [facility] Specialist Hematology Oncology [phone]   Renae ARREDONDO   Specialist Neurology [phone]     Signatures   Electronically signed by : Rhonda Rice, ; Sep  8 2016 10:44AM EST                       (Author)

## 2018-01-16 NOTE — PROGRESS NOTES
Assessment    1  Anemia (285 9) (D64 9)   2  Chronic kidney disease, stage 3 (585 3) (N18 3)    Plan  Anemia, Chronic kidney disease, stage 3    · (1) FERRITIN; Status:Active; Requested for:02Aug2016;    Perform:[address]; JSL:42JNR9133;PHAWQGT; For:Anemia, Chronic kidney disease, stage 3; Ordered By:John Maza;  Chronic kidney disease, stage 3    · Drink plenty of fluids ; Status:Complete;   Done: 62HCE9435   Ordered; For:Chronic kidney disease, stage 3; Ordered By:John Maza;   · Follow-up visit in 2 months Evaluation and Treatment  Follow-up  Status: Hold For -  Scheduling  Requested for: 21MST0029   Ordered; For: Chronic kidney disease, stage 3; Ordered By: Snehal Florence Performed:  Due: 41KRW2083   · (1) CBC/ PLT (NO DIFF); Status:Active; Requested for:02Aug2016;    Perform:[address]; APRIL:36KWT7996;PYOBUDM; For:Chronic kidney disease, stage 3; Ordered By:John Maza;   · (1) COMPREHENSIVE METABOLIC PANEL; Status:Active; Requested for:02Aug2016;    Perform:[address]; OIA:38CJJ2083;XBPDFXZ; For:Chronic kidney disease, stage 3; Ordered By:John Maza;   · (1) IRON SATURATION %, TIBC; Status:Active; Requested for:02Aug2016;    Perform:[address]; ENQ:24YEJ2260;PWDYUYW; For:Chronic kidney disease, stage 3; Ordered By:Pantera Maza Simpler;   · 1 Bee Patel MD, Cassy Pak (Gastroenterology) Physician Referral  Consult  Capsule endo  Status: Active  Requested for: 93LKL7824   Ordered; For: Chronic kidney disease, stage 3; Ordered By: Snehal Florence Performed:  Due: 59PTW6024  Care Summary provided  : Yes    Discussion/Summary  Discussion Summary:   In summary, this is a 49-year-old male history of anemia as outlined  Anemia as a result of both iron deficiency and erythropoietin deficiency  Parenteral iron is arranged  Feraheme 510 mg Ã2 doses are planned  Creatinine is approximately 1 6, correlating with a GFR less than 60    I reviewed the above considerations with the patient  Further, we made arrangements for a capsule endoscopy  He had a Hemoccults during hospitalization as well as prior Hemoccults which were negative  These certainly do not rule out the possibility of occult GI bleed, however  Further  We reviewed that if AVM were detected, treatment would be applicable via long endoscopy  If not, other considerations could be undertaken  The patient voiced understanding and agreement  Medication SE Review and Pt Understands Tx: Possible side effects of new medications were reviewed with the patient/guardian today  The treatment plan was reviewed with the patient/guardian  The patient/guardian understands and agrees with the treatment plan   Understands and agrees with treatment plan: The treatment plan was reviewed with the patient/guardian  The patient/guardian understands and agrees with the treatment plan   Counseling Documentation With Imm: The patient was counseled regarding diagnostic results, instructions for management, patient and family education, impressions  total time of encounter was 40 minutes  Chief Complaint  Chief Complaint Free Text Note Form: followup regarding anemia  History of Present Illness  HPI: Has had back problems for years but worse lately  Oct 2014- Going to have spine surg and found to have hgb 6 5  Blood work showed a depressed ferritin, and saturation  Other studies were normal  The patient was started on Feraheme 510 mg x2 doses  Hemoglobin increased to 8 1 in 10 days  2/3/15- had back surgery  Complicated by wound infection  May 2016-patient was admitted to the hospital with progressive shortness of breath  Outpatient CBC showed a hemoglobin of 5 4  He was treated with transfusions  Iron saturation was 4%  He did not receive parenteral iron during his hospitalization  EGD showed hiatal hernia  Colonoscopy showed diverticulosis and a diminutive polyp which was removed     Interval History: Avoiding oxycodone due to concern for constipation  Review of Systems  Complete-Male:   Constitutional: appetite poor but wt stable  Eyes: No complaints of eye pain, no red eyes, no discharge from eyes, no itchy eyes  ENT: no complaints of earache, no hearing loss, no nosebleeds, no nasal discharge, no sore throat, no hoarseness  Cardiovascular: No complaints of slow heart rate, no fast heart rate, no chest pain, no palpitations, no leg claudication, no lower extremity  Respiratory: no shortness of breath during exertion  Gastrointestinal: colonoscopy~2008-1 polyps  The patient presents with complaints of no nausea  The patient presents with complaints of gradual onset of constant episodes of moderate constipation  Episodes started about 2 years ago  Symptoms are improving  Genitourinary: No complaints of dysuria, no incontinence, no hesitancy, no nocturia, no genital lesion, no testicular pain  Musculoskeletal: No complaints of arthralgia, no myalgias, no joint swelling or stiffness, no limb pain or swelling  Integumentary: No complaints of skin rash or skin lesions, no itching, no skin wound, no dry skin  Neurological: tingling and BL legs to thigh  , but No compliants of headache, no confusion, no convulsions, no numbness or tingling, no dizziness or fainting, no limb weakness, no difficulty walking  Psychiatric: Is not suicidal, no sleep disturbances, no anxiety or depression, no change in personality, no emotional problems  Endocrine: No complaints of proptosis, no hot flashes, no muscle weakness, no erectile dysfunction, no deepening of the voice, no feelings of weakness  Hematologic/Lymphatic: No complaints of swollen glands, no swollen glands in the neck, does not bleed easily, no easy bruising  Active Problems    1  Acute bronchitis (466 0) (J20 9)   2  Aftercare following surgery of the musculoskeletal system (V58 78) (Z47 89)   3  Anemia (285 9) (D64 9)   4   ASCVD (arteriosclerotic cardiovascular disease) (429 2,440 9) (I25 10)   5  Benign essential hypertension (401 1) (I10)   6  Benign prostatic hypertrophy (600 00) (N40 0)   7  Bulging lumbar disc (722 10) (M51 26)   8  History of CABG   9  Carotid artery stenosis (433 10) (I65 29)   10  Cervical spondylosis (721 0) (M47 812)   11  Cervical stenosis of spine (723 0) (M48 02)   12  Chronic kidney disease, stage 3 (585 3) (N18 3)   13  Chronic kidney disease, stage IV (severe) (585 4) (N18 4)   14  Chronic lumbar radiculopathy (724 4) (M54 16)   15  Coronary artery disease (414 00) (I25 10)   16  Degenerative disc disease, cervical (722 4) (M50 30)   17  Diastolic dysfunction (330 0) (I51 9)   18  Disc degeneration, lumbar (722 52) (M51 36)   19  Dyspnea (786 09) (R06 00)   20  Esophageal reflux (530 81) (K21 9)   21  Gout (274 9) (M10 9)   22  Hypercalcemia (275 42) (E83 52)   23  Hyperlipidemia (272 4) (E78 5)   24  Iron deficiency anemia (280 9) (D50 9)   25  Joint pain, knee (719 46) (M25 569)   26  Long term use of drug (V58 69) (Z79 899)   27  Lumbago (724 2) (M54 5)   28  History of Lumbar canal stenosis (724 02) (M48 06)   29  Lumbar canal stenosis (724 02) (M48 06)   30  Lumbar canal stenosis (724 02) (M48 06)   31  Lumbar canal stenosis (724 02) (M48 06)   32  Lumbar disc herniation (722 10) (M51 26)   33  Lumbar radiculopathy (724 4) (M54 16)   34  History of Lumbar radiculopathy (724 4) (M54 16)   35  Lumbar spinal stenosis (724 02) (M48 06)   36  Lumbar stenosis with neurogenic claudication (724 03) (M48 06)   37  Mitral regurgitation (424 0) (I34 0)   38  Myalgia And Myositis (729 1)   39  Need for prophylactic vaccination against single diseases (V05 9) (Z23)   40  Need for prophylactic vaccination and inoculation against influenza (V04 81) (Z23)   41  Non-healing surgical wound, initial encounter (998 83) (T81 89XA)   42  Numbness (782 0) (R20 0)   43  Osteoarthritis of knee (715 36) (M17 9)   44   Peripheral arterial disease (443  9) (I73 9)   45  Premature ventricular contractions (PVCs) (VPCs) (427 69) (I49 3)   46  Pre-operative cardiovascular examination (V72 81) (Z01 810)   47  Scoliosis (737 30) (M41 9)   48  Screening for neurological condition (V80 09) (Z13 89)   49  Shortness of breath (786 05) (R06 02)   50  Sinus tachycardia (427 89) (R00 0)   51  Spinal cord compression (336 9) (G95 20)   52  Spinal deformity (756 10) (Q76 49)   53  Spondylosis of lumbar region without myelopathy or radiculopathy (721 3) (M47 816)   54  Syncope, unspecified syncope type (780 2) (R55)   55  Thoracic disc disorder with myelopathy (722 72) (M51 04)   56  Trigger index finger of left hand (727 03) (M65 322)   57  Umbilical hernia without obstruction and without gangrene (553 1) (K42 9)   58  Urinary frequency (788 41) (R35 0)   59  Visit for pre-operative examination (V72 84) (Y65 639)    Past Medical History    1  History of anemia (V12 3) (Z86 2)   2  History of gastritis (V12 79) (Z87 19)   3  History of gastroesophageal reflux (GERD) (V12 79) (Z87 19)   4  History of hiatal hernia (V12 79) (Z87 19)   5  History of hypertension (V12 59) (Z86 79)   6  History of low back pain (V13 59) (Z87 39)   7  History of nausea (V12 79) (Z87 898)   8  History of Lumbar canal stenosis (724 02) (M48 06)   9  History of Lumbar canal stenosis (724 02) (M48 06)   10  History of Lumbar radiculopathy (724 4) (M54 16)   11  History of Lumbar radiculopathy (724 4) (M54 16)   12  History of Lumbar radiculopathy (724 4) (M54 16)   13  History of Myelopathy (336 9) (G95 9)   14  Need for prophylactic vaccination and inoculation against influenza (V04 81) (Z23)   15  History of Nephrolithiasis (592 0) (N20 0)   16  History of Non-healing surgical wound, subsequent encounter (V58 89,998 83)    (T81 89XD)   17  History of Prostate cancer (185) (C61)   18  History of Prostate Cancer (V10 46)   19  History of Tachycardia (785 0) (R00 0)   20   History of Thoracic disc disorder with myelopathy (722 72) (M51 04)   21  History of Vasovagal syncope (780 2) (R55)    Surgical History    1  History of Back Surgery   2  History of CABG   3  History of Hand Incision Tendon Sheath Of A Finger   4  History of Hernia Repair Inguinal Unilateral   5  History of Knee Replacement   6  History of Laminectomy Lumbar   7  History of Neuroplasty Decompression Median Nerve At Carpal Tunnel   8  History of Spinal Diskectomy Lumbar   9  History of Wrist Surgery Left    Family History  Mother    1  Family history of    2  Family history of Heart disease  Father    3  Family history of    4  Family history of cardiac disorder (V17 49) (Z82 49)  Other    5  Family history of Medical history unknown  Unknown    6  Family history of hypertension (V17 49) (Z82 49)   7  Family history of Prostate cancer    Social History    · Denied: History of Currently sexually active   · Denied: Drug Use   · Former smoker ( 82) (A82 703)   · Minimum alcohol consumption   · No known STD risk factors   · Retired    Current Meds   1  Allopurinol 300 MG Oral Tablet; TAKE 1 TABLET DAILY; Therapy: (Recorded:2015) to Recorded   2  Aspirin EC 81 MG Oral Tablet Delayed Release; TAKE 1 TABLET DAILY; Therapy: 10RQR9415 to [id number])  Requested for: 24NEB5860; Last   Rx:00Vlq2138 Ordered   3  Atorvastatin Calcium 20 MG Oral Tablet; TAKE 1 TABLET DAILY; Therapy: 95ZGL1091 to (Evaluate:01Vdm0217)  Requested for: 92NJV4479; Last   Rx:02Xhf6943 Ordered   4  Pantoprazole Sodium 40 MG Oral Tablet Delayed Release; take 1 tablet once daily; Therapy: 06KMD4627 to (Evaluate:04Sqo1150)  Requested for: 88Dpf5312; Status: ACTIVE   - Renewal Voided Recorded   5  Tamsulosin HCl - 0 4 MG Oral Capsule; Take 1 capsule one-half hour before same meal   daily; Therapy: 96IRR8001 to (Evaluate:66Jht4874)  Requested for: 04Dtf4637; Last   Rx:41Mbn0041 Ordered    Allergies    1  Oxycodone-Acetaminophen CAPS    2   No Known Environmental Allergies   3  No Known Food Allergies    Vitals  Vital Signs [Data Includes: Current Encounter]    Recorded: 94PUL1771 09:00AM   Temperature 97 3 F   Heart Rate 90   Respiration 20   Systolic 412   Diastolic 74   Height 5 ft 4 in   Weight 148 lb    BMI Calculated 25 4   BSA Calculated 1 73   O2 Saturation 99   Pain Scale 0     Physical Exam    Constitutional   General appearance: No acute distress, well appearing and well nourished  Eyes   Conjunctiva and lids: No swelling, erythema, or discharge  Ears, Nose, Mouth, and Throat   External inspection of ears and nose: Normal     Oropharynx: Normal with no erythema, edema, exudate or lesions  Pulmonary   Auscultation of lungs: Clear to auscultation, equal breath sounds bilaterally, no wheezes, no rales, no rhonci  Cardiovascular   Auscultation of heart: Normal rate and rhythm, normal S1 and S2, without murmurs  Examination of extremities for edema and/or varicosities: Normal     Abdomen   Abdomen: Non-tender, no masses  Liver and spleen: No hepatomegaly or splenomegaly  Lymphatic   Palpation of lymph nodes in neck: No lymphadenopathy  Musculoskeletal   Gait and station: Normal     Skin   Skin and subcutaneous tissue: Normal without rashes or lesions  Neurologic   Cranial nerves: Cranial nerves 2-12 intact  Psychiatric   Orientation to person, place and time: Normal          Health Management  Health Maintenance   Medicare Annual Wellness Visit; every 1 year; Next Due: 29ILF7591; Overdue    Future Appointments    Date/Time Provider Specialty Site   06/30/2016 07:30 AM Don Kirk MD General Surgery [facility] 96 OR   07/13/2016 01:00 PM Don Kirk MD General Surgery [facility] SURGICAL [place]   09/06/2016 02:30 PM Annetta Wheatley MD Internal Medicine [facility]   06/21/2016 02:30 PM BELLA Puri   Neurology [facility]     Signatures Electronically signed by : BELLA Francisco ,DO; Jun 9 2016  9:52AM EST                       (Author)

## 2018-01-17 NOTE — MISCELLANEOUS
Message   Recorded as Task   Date: 04/27/2016 03:48 PM, Created By: Yamile Chowdhury   Task Name: Authorize Order   Assigned To: Yamile Chowdhury   Regarding Patient: Christina Thayer, Status: In Progress   Carrol Wesley - 27 Apr 2016 3:48 PM        Yamile Chowdhury - 28 Apr 2016 6:53 AM     TASK IN PROGRESS   Yamile Chowdhury - 28 Apr 2016 6:57 AM     TASK EDITED   Yamile Chowdhury - 29 Apr 2016 10:47 AM     TASK EDITED  #1  He was advised to call Dr Melyssa Hollingsworth, who has been presribing the medication for him  Signatures   Electronically signed by :  Zara Bush MD; Apr 29 2016 10:47AM EST                       (Author)

## 2019-12-13 ENCOUNTER — HOSPITAL ENCOUNTER (EMERGENCY)
Facility: HOSPITAL | Age: 79
Discharge: STILL ADMITTED OR ADMINISTRATIVE | End: 2019-12-13